# Patient Record
Sex: FEMALE | Race: ASIAN | Employment: UNEMPLOYED | URBAN - METROPOLITAN AREA
[De-identification: names, ages, dates, MRNs, and addresses within clinical notes are randomized per-mention and may not be internally consistent; named-entity substitution may affect disease eponyms.]

---

## 2017-06-12 ENCOUNTER — OFFICE VISIT (OUTPATIENT)
Dept: FAMILY MEDICINE CLINIC | Facility: CLINIC | Age: 22
End: 2017-06-12

## 2017-06-12 VITALS
HEIGHT: 64 IN | RESPIRATION RATE: 18 BRPM | OXYGEN SATURATION: 98 % | TEMPERATURE: 98 F | DIASTOLIC BLOOD PRESSURE: 60 MMHG | BODY MASS INDEX: 26.46 KG/M2 | WEIGHT: 155 LBS | HEART RATE: 96 BPM | SYSTOLIC BLOOD PRESSURE: 116 MMHG

## 2017-06-12 DIAGNOSIS — R51.9 HEADACHE, UNSPECIFIED HEADACHE TYPE: ICD-10-CM

## 2017-06-12 DIAGNOSIS — J30.2 SEASONAL ALLERGIC RHINITIS, UNSPECIFIED ALLERGIC RHINITIS TRIGGER: ICD-10-CM

## 2017-06-12 DIAGNOSIS — O23.42 UTI (URINARY TRACT INFECTION) DURING PREGNANCY, SECOND TRIMESTER: ICD-10-CM

## 2017-06-12 DIAGNOSIS — Z3A.17 17 WEEKS GESTATION OF PREGNANCY: Primary | ICD-10-CM

## 2017-06-12 PROCEDURE — 85025 COMPLETE CBC W/AUTO DIFF WBC: CPT | Performed by: FAMILY MEDICINE

## 2017-06-12 PROCEDURE — 87147 CULTURE TYPE IMMUNOLOGIC: CPT | Performed by: FAMILY MEDICINE

## 2017-06-12 PROCEDURE — 87086 URINE CULTURE/COLONY COUNT: CPT | Performed by: FAMILY MEDICINE

## 2017-06-12 PROCEDURE — 99204 OFFICE O/P NEW MOD 45 MIN: CPT | Performed by: FAMILY MEDICINE

## 2017-06-12 PROCEDURE — 81003 URINALYSIS AUTO W/O SCOPE: CPT | Performed by: FAMILY MEDICINE

## 2017-06-12 PROCEDURE — 36415 COLL VENOUS BLD VENIPUNCTURE: CPT | Performed by: FAMILY MEDICINE

## 2017-06-12 PROCEDURE — 80053 COMPREHEN METABOLIC PANEL: CPT | Performed by: FAMILY MEDICINE

## 2017-06-12 PROCEDURE — 81025 URINE PREGNANCY TEST: CPT | Performed by: FAMILY MEDICINE

## 2017-06-12 PROCEDURE — 84443 ASSAY THYROID STIM HORMONE: CPT | Performed by: FAMILY MEDICINE

## 2017-06-12 RX ORDER — PNV NO.95/FERROUS FUM/FOLIC AC 28MG-0.8MG
1 TABLET ORAL DAILY
Qty: 30 TABLET | Refills: 11 | Status: SHIPPED | OUTPATIENT
Start: 2017-06-12 | End: 2017-06-13

## 2017-06-12 RX ORDER — AMOXICILLIN AND CLAVULANATE POTASSIUM 875; 125 MG/1; MG/1
1 TABLET, FILM COATED ORAL 2 TIMES DAILY
Qty: 10 TABLET | Refills: 0 | Status: SHIPPED | OUTPATIENT
Start: 2017-06-12 | End: 2017-06-13

## 2017-06-12 NOTE — PROGRESS NOTES
HPI:    Patient ID: Brent Sexton is a 25year old female. HPI   21yr old gravid female presents to establish care, pregnancy, c/o urinary frequency, and headaches. This is her first pregnancy, LMP approximately 2/9/17.  Was seen by an OB in [place], and time. She appears well-developed and well-nourished. HENT:   Head: Normocephalic and atraumatic. Mouth/Throat: Oropharynx is clear and moist.   Swollen turbinates   Cardiovascular: Normal rate and regular rhythm.     No LE edema   Pulmonary/C Visit:  Signed Prescriptions Disp Refills    Amoxicillin-Pot Clavulanate 875-125 MG Oral Tab 10 tablet 0      Sig: Take 1 tablet by mouth 2 (two) times daily.       Prenatal Vit-Fe Fumarate-FA (PRENATAL VITAMIN) 27-0.8 MG Oral Tab 30 tablet 11      Sig: Nabeel Maharaj

## 2017-06-13 ENCOUNTER — OFFICE VISIT (OUTPATIENT)
Dept: OBGYN CLINIC | Facility: CLINIC | Age: 22
End: 2017-06-13

## 2017-06-13 ENCOUNTER — LAB ENCOUNTER (OUTPATIENT)
Dept: LAB | Age: 22
End: 2017-06-13
Attending: OBSTETRICS & GYNECOLOGY
Payer: MEDICAID

## 2017-06-13 VITALS
DIASTOLIC BLOOD PRESSURE: 72 MMHG | HEIGHT: 65 IN | SYSTOLIC BLOOD PRESSURE: 112 MMHG | WEIGHT: 157 LBS | BODY MASS INDEX: 26.16 KG/M2

## 2017-06-13 DIAGNOSIS — Z34.02 ENCOUNTER FOR SUPERVISION OF NORMAL FIRST PREGNANCY IN SECOND TRIMESTER: ICD-10-CM

## 2017-06-13 DIAGNOSIS — Z34.02 ENCOUNTER FOR SUPERVISION OF NORMAL FIRST PREGNANCY IN SECOND TRIMESTER: Primary | ICD-10-CM

## 2017-06-13 DIAGNOSIS — N89.8 VAGINAL DISCHARGE: ICD-10-CM

## 2017-06-13 DIAGNOSIS — Z11.3 SCREENING FOR STD (SEXUALLY TRANSMITTED DISEASE): ICD-10-CM

## 2017-06-13 PROCEDURE — 86901 BLOOD TYPING SEROLOGIC RH(D): CPT

## 2017-06-13 PROCEDURE — 87491 CHLMYD TRACH DNA AMP PROBE: CPT | Performed by: OBSTETRICS & GYNECOLOGY

## 2017-06-13 PROCEDURE — 0500F INITIAL PRENATAL CARE VISIT: CPT | Performed by: OBSTETRICS & GYNECOLOGY

## 2017-06-13 PROCEDURE — 87147 CULTURE TYPE IMMUNOLOGIC: CPT | Performed by: OBSTETRICS & GYNECOLOGY

## 2017-06-13 PROCEDURE — 87591 N.GONORRHOEAE DNA AMP PROB: CPT | Performed by: OBSTETRICS & GYNECOLOGY

## 2017-06-13 PROCEDURE — 36415 COLL VENOUS BLD VENIPUNCTURE: CPT

## 2017-06-13 PROCEDURE — 86762 RUBELLA ANTIBODY: CPT

## 2017-06-13 PROCEDURE — 87660 TRICHOMONAS VAGIN DIR PROBE: CPT | Performed by: OBSTETRICS & GYNECOLOGY

## 2017-06-13 PROCEDURE — 87480 CANDIDA DNA DIR PROBE: CPT | Performed by: OBSTETRICS & GYNECOLOGY

## 2017-06-13 PROCEDURE — 81511 FTL CGEN ABNOR FOUR ANAL: CPT

## 2017-06-13 PROCEDURE — 86900 BLOOD TYPING SEROLOGIC ABO: CPT

## 2017-06-13 PROCEDURE — 86850 RBC ANTIBODY SCREEN: CPT

## 2017-06-13 PROCEDURE — 87086 URINE CULTURE/COLONY COUNT: CPT | Performed by: OBSTETRICS & GYNECOLOGY

## 2017-06-13 PROCEDURE — 86780 TREPONEMA PALLIDUM: CPT

## 2017-06-13 PROCEDURE — 87340 HEPATITIS B SURFACE AG IA: CPT

## 2017-06-13 PROCEDURE — 87510 GARDNER VAG DNA DIR PROBE: CPT | Performed by: OBSTETRICS & GYNECOLOGY

## 2017-06-13 PROCEDURE — 87389 HIV-1 AG W/HIV-1&-2 AB AG IA: CPT

## 2017-06-13 NOTE — PROGRESS NOTES
Here for initial prenatal visit with our group. 25year old  at 17w5d by LMP c/w todays US. Unplanned pregnancy. Pt came from [facility] May 15th, 10 months.  there and became pregnant.      OB Hx: none   PMH: none   PSxH: none    Patient's last m

## 2017-06-14 NOTE — PROGRESS NOTES
Quick Note:    Left message on answering machine for patient to return phone call  Patient has 2 results  ______

## 2017-06-16 RX ORDER — METRONIDAZOLE 7.5 MG/G
1 GEL VAGINAL NIGHTLY
Qty: 70 G | Refills: 0 | Status: SHIPPED | OUTPATIENT
Start: 2017-06-16 | End: 2017-06-21

## 2017-06-16 RX ORDER — FLUCONAZOLE 150 MG/1
150 TABLET ORAL ONCE
Qty: 1 TABLET | Refills: 0 | Status: SHIPPED | OUTPATIENT
Start: 2017-06-16 | End: 2017-06-16

## 2017-06-16 NOTE — PROGRESS NOTES
Quick Note:    Patient informed of results. Verbalized understanding. Patient already started Augmentin from her PCP. Please advise if patient should start Keflex or continue w/ Augmentin. RX's sent to pharmacy. No further questions or concerns.   ____

## 2017-06-28 ENCOUNTER — TELEPHONE (OUTPATIENT)
Dept: OBGYN CLINIC | Facility: CLINIC | Age: 22
End: 2017-06-28

## 2017-06-28 ENCOUNTER — APPOINTMENT (OUTPATIENT)
Dept: OBGYN CLINIC | Facility: CLINIC | Age: 22
End: 2017-06-28

## 2017-06-28 ENCOUNTER — OFFICE VISIT (OUTPATIENT)
Dept: OBGYN CLINIC | Facility: CLINIC | Age: 22
End: 2017-06-28

## 2017-06-28 VITALS
WEIGHT: 161 LBS | DIASTOLIC BLOOD PRESSURE: 54 MMHG | SYSTOLIC BLOOD PRESSURE: 102 MMHG | HEIGHT: 65 IN | BODY MASS INDEX: 26.82 KG/M2

## 2017-06-28 DIAGNOSIS — Z34.02 ENCOUNTER FOR SUPERVISION OF NORMAL FIRST PREGNANCY IN SECOND TRIMESTER: ICD-10-CM

## 2017-06-28 DIAGNOSIS — Z36.89 ENCOUNTER FOR FETAL ANATOMIC SURVEY: Primary | ICD-10-CM

## 2017-06-28 DIAGNOSIS — G93.0 CHOROID PLEXUS CYST: ICD-10-CM

## 2017-06-28 PROCEDURE — 76805 OB US >/= 14 WKS SNGL FETUS: CPT | Performed by: OBSTETRICS & GYNECOLOGY

## 2017-06-28 PROCEDURE — 0502F SUBSEQUENT PRENATAL CARE: CPT | Performed by: OBSTETRICS & GYNECOLOGY

## 2017-06-28 NOTE — TELEPHONE ENCOUNTER
Per Dr. Garrison Hernandez, refer patient to [facility] for level II ultrasound for choroid plexus cyst and unable to visualize cardiac views  Order, prenatal and insurance card faxed to Dr. Rush [place] office. Patient given phone number to call Dr. Denia Amin.

## 2017-06-28 NOTE — PROGRESS NOTES
STU  Doing well  RH pos  S/P Anatomy scan  wnl except choroid plexus cyst and unable to visualize all cardiac views. MFM Level 2 recommended.  Normal quad screen   1 hr glucose (24-28wks)  TDAP recommended during pregnancy and instructions given  RTC q4wks

## 2017-06-28 NOTE — PATIENT INSTRUCTIONS
I am recommending a Level 2 ultrasound with maternal fetal medicine based on today's ultrasound findings. Our office will contact you once the referral is placed. SCREENING FOR GESTATIONAL DIABETES    Pregnancy is a stress to your body.   One way outpatient laboratory facility, you will be given a 50 gm carbohydrate drink. A blood glucose level will then be drawn one hour after ingestion. If the blood glucose value is normal and no other risk factors exist, no further testing is necessary.   If

## 2017-07-07 ENCOUNTER — OFFICE VISIT (OUTPATIENT)
Dept: FAMILY MEDICINE CLINIC | Facility: CLINIC | Age: 22
End: 2017-07-07

## 2017-07-07 VITALS
DIASTOLIC BLOOD PRESSURE: 52 MMHG | OXYGEN SATURATION: 99 % | RESPIRATION RATE: 16 BRPM | BODY MASS INDEX: 27.72 KG/M2 | WEIGHT: 166.38 LBS | HEIGHT: 65 IN | SYSTOLIC BLOOD PRESSURE: 90 MMHG | HEART RATE: 91 BPM | TEMPERATURE: 99 F

## 2017-07-07 DIAGNOSIS — H01.116 DERMATITIS CONTACT, EYELID, LEFT: Primary | ICD-10-CM

## 2017-07-07 PROCEDURE — 99213 OFFICE O/P EST LOW 20 MIN: CPT | Performed by: FAMILY MEDICINE

## 2017-07-07 NOTE — PROGRESS NOTES
HPI:    Patient ID: Regina Livingston is a 25year old female. HPI  24yr old female presents with c/o rash on her L upper eyelid that has been ongoing for the past 2mo on and off since she was staying in [facility]. Has tried otc lubricants without any relief.

## 2017-07-07 NOTE — PATIENT INSTRUCTIONS
Nonspecific Dermatitis  Dermatitis is a skin rash caused by something that touches the skin and makes it irritated and inflamed.  Your skin may be red, swollen, dry, and may be cracked. Blisters may form and ooze. The rash will itch.   Dermatitis can form · Use hydrocortisone cream for redness and irritation, unless another medicine was prescribed. You can also use benzocaine anesthetic cream or spray. · Use oral diphenhydramine to help reduce itching.  This is an antihistamine you can buy at [facility]

## 2017-07-12 NOTE — TELEPHONE ENCOUNTER
Dr. Juice Xiong office is closed today. Advised patient I can call tomorrow to see what additional information they need. Verbalized understanding. No further questions or concerns.

## 2017-07-12 NOTE — TELEPHONE ENCOUNTER
Pt called stating [facility] office says they need more info as to why she is being referred to them.

## 2017-07-13 ENCOUNTER — TELEPHONE (OUTPATIENT)
Dept: OBGYN CLINIC | Facility: CLINIC | Age: 22
End: 2017-07-13

## 2017-07-13 NOTE — TELEPHONE ENCOUNTER
Received Notification from [address] [place] [address] that Pt did not keep appt today at 230pm and was unable to reach via phone    Advisement letter put in Dr. Julianna dukes in [place]

## 2017-07-20 ENCOUNTER — TELEPHONE (OUTPATIENT)
Dept: OBGYN CLINIC | Facility: CLINIC | Age: 22
End: 2017-07-20

## 2017-07-27 ENCOUNTER — OFFICE VISIT (OUTPATIENT)
Dept: OBGYN CLINIC | Facility: CLINIC | Age: 22
End: 2017-07-27

## 2017-07-27 VITALS
HEIGHT: 65 IN | DIASTOLIC BLOOD PRESSURE: 56 MMHG | BODY MASS INDEX: 28.66 KG/M2 | SYSTOLIC BLOOD PRESSURE: 118 MMHG | WEIGHT: 172 LBS | HEART RATE: 112 BPM

## 2017-07-27 DIAGNOSIS — G93.0 CHOROID PLEXUS CYST: ICD-10-CM

## 2017-07-27 DIAGNOSIS — Z34.02 ENCOUNTER FOR SUPERVISION OF NORMAL FIRST PREGNANCY IN SECOND TRIMESTER: ICD-10-CM

## 2017-07-27 DIAGNOSIS — Z3A.24 24 WEEKS GESTATION OF PREGNANCY: Primary | ICD-10-CM

## 2017-07-27 PROCEDURE — 0502F SUBSEQUENT PRENATAL CARE: CPT | Performed by: OBSTETRICS & GYNECOLOGY

## 2017-07-27 NOTE — PROGRESS NOTES
Has not done 1 hr gct  Has u/s with mfm arranged for 8/9/17  Switching to [facility] tomorrow--\"more convenient\"  Recommend to get gct done

## 2017-07-27 NOTE — PATIENT INSTRUCTIONS
Tdap Vaccine: What You Need To Know    1. Why Get Vaccinated:    · Tetanus, diphtheria, and pertussis can be very serious diseases, even for adolescents and adults. Tdap vaccine can protect us from these diseases.     · Tetanus (Lockjaw) causes painful mu tetanus infection. SCREENING FOR GESTATIONAL DIABETES    Pregnancy is a stress to your body. One way in which the stress may manifest itself is through an abnormality in sugar metabolism.   In non-pregnant patients, this abnormality (a lack of insulin) the blood glucose value is normal and no other risk factors exist, no further testing is necessary.   If the blood glucose is abnormal, this indicates an increased risk of gestational diabetes and will require a formal three hour glucose tolerance test.  Th

## 2017-07-28 PROCEDURE — 87086 URINE CULTURE/COLONY COUNT: CPT | Performed by: OBSTETRICS & GYNECOLOGY

## 2017-08-16 ENCOUNTER — OFFICE VISIT (OUTPATIENT)
Dept: PERINATAL CARE | Facility: HOSPITAL | Age: 22
End: 2017-08-16
Attending: OBSTETRICS & GYNECOLOGY
Payer: MEDICAID

## 2017-08-16 VITALS
BODY MASS INDEX: 29 KG/M2 | SYSTOLIC BLOOD PRESSURE: 114 MMHG | DIASTOLIC BLOOD PRESSURE: 57 MMHG | WEIGHT: 174 LBS | HEART RATE: 107 BPM

## 2017-08-16 DIAGNOSIS — O35.0XX0 CHOROID PLEXUS CYST OF FETUS AFFECTING CARE OF MOTHER, ANTEPARTUM, NOT APPLICABLE OR UNSPECIFIED FETUS: ICD-10-CM

## 2017-08-16 DIAGNOSIS — Z03.73 SUSPECTED FETAL ANOMALY NOT FOUND: Primary | ICD-10-CM

## 2017-08-16 PROCEDURE — 76811 OB US DETAILED SNGL FETUS: CPT

## 2017-08-16 PROCEDURE — 99242 OFF/OP CONSLTJ NEW/EST SF 20: CPT

## 2017-08-16 NOTE — PROGRESS NOTES
Outpatient Maternal-Fetal Medicine Consultation    Dear Dr. Eliceo Wagoner,    Thank you for requesting ultrasound evaluation and maternal fetal medicine consultation on your patient Dominique Zamora.   As you are aware she is a 25year old female with a [place] pre non-tender  Extremities: non-tender, no edema      OBSTETRIC ULTRASOUND  A level II ultrasound was performed prior to the consultation which I interpreted and discussed with the patient and her mother. The fetal measurements are consistent with dates.   Jayleen Mcleod falx, cerebellum, cerebellar lobes, posterior fossa, vermis, cavum septi pellucidi. Heart: Visualized and normal appearance: cardiac location, four-chamber view, left outflow tract, right outflow tract, ventricles, great vessels, Ductal arch.    Aortic arc patient. Please do not hesitate to contact me if additional questions or concerns arise. Adria Vasquez M.D. The majority of the time (>50%) was spent in review of records, consultation and coordination of care. Our discussion is summarized above.  Terry Li

## 2017-09-26 PROCEDURE — 82950 GLUCOSE TEST: CPT | Performed by: OBSTETRICS & GYNECOLOGY

## 2017-09-26 PROCEDURE — 87389 HIV-1 AG W/HIV-1&-2 AB AG IA: CPT | Performed by: OBSTETRICS & GYNECOLOGY

## 2017-10-06 ENCOUNTER — OFFICE VISIT (OUTPATIENT)
Dept: HEMATOLOGY/ONCOLOGY | Facility: HOSPITAL | Age: 22
End: 2017-10-06
Attending: SPECIALIST
Payer: MEDICAID

## 2017-10-06 VITALS
OXYGEN SATURATION: 98 % | HEART RATE: 114 BPM | DIASTOLIC BLOOD PRESSURE: 72 MMHG | HEIGHT: 65 IN | WEIGHT: 193.81 LBS | BODY MASS INDEX: 32.29 KG/M2 | RESPIRATION RATE: 18 BRPM | SYSTOLIC BLOOD PRESSURE: 109 MMHG

## 2017-10-06 DIAGNOSIS — D50.0 IRON DEFICIENCY ANEMIA DUE TO CHRONIC BLOOD LOSS: Primary | ICD-10-CM

## 2017-10-06 DIAGNOSIS — O99.013 ANEMIA AFFECTING PREGNANCY IN THIRD TRIMESTER: ICD-10-CM

## 2017-10-06 DIAGNOSIS — D50.9 HYPOCHROMIC MICROCYTIC ANEMIA: ICD-10-CM

## 2017-10-06 PROCEDURE — 99245 OFF/OP CONSLTJ NEW/EST HI 55: CPT | Performed by: SPECIALIST

## 2017-10-06 NOTE — PROGRESS NOTES
Patient is here today for Hematology consult with Marquis Valdez. Patient denies pain. Patient stated is pregnant Due date Nov 16,2017. Patient did not take oral iron. Is on prenatal vitamins. Medication list, medical history were reviewed and updated.     Educ

## 2017-10-09 PROBLEM — D50.9 HYPOCHROMIC MICROCYTIC ANEMIA: Status: ACTIVE | Noted: 2017-10-09

## 2017-10-09 PROBLEM — O99.013 ANEMIA AFFECTING PREGNANCY IN THIRD TRIMESTER (HCC): Status: ACTIVE | Noted: 2017-10-09

## 2017-10-09 PROBLEM — D50.0 IRON DEFICIENCY ANEMIA DUE TO CHRONIC BLOOD LOSS: Status: ACTIVE | Noted: 2017-10-09

## 2017-10-09 PROBLEM — O99.013 ANEMIA AFFECTING PREGNANCY IN THIRD TRIMESTER: Status: ACTIVE | Noted: 2017-10-09

## 2017-10-09 NOTE — PROGRESS NOTES
[facility] Report of Consultation      Patient Name: Rodrigue Hernandez   YOB: 1995  Medical Record Number: [id number]  Consulting Physician: Saurabh Constantino M.D. Referring Physician: Heladio Lechuga M.D.     Date of Consultation: 10/6/2 palpitations, edema, orthopnea. Gastrointestinal   Intermittent constipation. Genitourinary      No vaginal bleeding. Musculoskeletal   No edema; no joint pain, swelling, redness, change in range of motion.   Integumentary      No acute or chronic rashe - 45 %   -VITAMIN B12   Collection Time: 10/06/17  3:52 PM   Result Value Ref Range   Vitamin B12 299 193 - 032 pg/mL   -FOLIC ACID SERUM(FOLATE)   Collection Time: 10/06/17  3:52 PM   Result Value Ref Range   Folate (Folic Acid), Serum 60.5 8.7 - 24.0 ng/ Hematology Oncology Group  Director, Bone and Soft Tissue Sarcoma Program  Section of Hematology/Oncology, [address] [facility]

## 2017-10-10 ENCOUNTER — OFFICE VISIT (OUTPATIENT)
Dept: HEMATOLOGY/ONCOLOGY | Facility: HOSPITAL | Age: 22
End: 2017-10-10
Attending: SPECIALIST
Payer: MEDICAID

## 2017-10-10 VITALS
OXYGEN SATURATION: 99 % | TEMPERATURE: 98 F | RESPIRATION RATE: 18 BRPM | HEART RATE: 97 BPM | DIASTOLIC BLOOD PRESSURE: 67 MMHG | SYSTOLIC BLOOD PRESSURE: 102 MMHG

## 2017-10-10 DIAGNOSIS — D50.0 IRON DEFICIENCY ANEMIA DUE TO CHRONIC BLOOD LOSS: Primary | ICD-10-CM

## 2017-10-10 DIAGNOSIS — O99.013 ANEMIA AFFECTING PREGNANCY IN THIRD TRIMESTER: ICD-10-CM

## 2017-10-10 DIAGNOSIS — D50.9 HYPOCHROMIC MICROCYTIC ANEMIA: ICD-10-CM

## 2017-10-10 PROCEDURE — 96365 THER/PROPH/DIAG IV INF INIT: CPT

## 2017-10-10 NOTE — PROGRESS NOTES
Education Record    Learner:  Patient    Disease / Freddy Meenakshi deficiency anemia due to chronic blood loss    Barriers / Limitations:  None   Comments:    Method:  Brief focused   Comments:    General Topics:  Infection, Medication, Pain, Precautions, P

## 2017-10-17 ENCOUNTER — APPOINTMENT (OUTPATIENT)
Dept: HEMATOLOGY/ONCOLOGY | Facility: HOSPITAL | Age: 22
End: 2017-10-17
Attending: INTERNAL MEDICINE
Payer: MEDICAID

## 2017-10-23 PROCEDURE — 87081 CULTURE SCREEN ONLY: CPT | Performed by: OBSTETRICS & GYNECOLOGY

## 2017-11-16 ENCOUNTER — HOSPITAL ENCOUNTER (OUTPATIENT)
Dept: ULTRASOUND IMAGING | Age: 22
Discharge: HOME | End: 2017-11-16
Attending: OBSTETRICS & GYNECOLOGY
Payer: MEDICAID

## 2017-11-16 DIAGNOSIS — Z34.03 ENCOUNTER FOR SUPERVISION OF NORMAL FIRST PREGNANCY IN THIRD TRIMESTER: ICD-10-CM

## 2017-11-16 PROCEDURE — 76805 OB US >/= 14 WKS SNGL FETUS: CPT | Performed by: OBSTETRICS & GYNECOLOGY

## 2017-11-17 ENCOUNTER — TELEPHONE (OUTPATIENT)
Dept: OBGYN UNIT | Facility: HOSPITAL | Age: 22
End: 2017-11-17

## 2017-11-21 ENCOUNTER — APPOINTMENT (OUTPATIENT)
Dept: OBGYN CLINIC | Facility: HOSPITAL | Age: 22
End: 2017-11-21
Payer: MEDICAID

## 2017-11-21 ENCOUNTER — HOSPITAL ENCOUNTER (INPATIENT)
Facility: HOSPITAL | Age: 22
LOS: 3 days | Discharge: HOME | End: 2017-11-24
Attending: OBSTETRICS & GYNECOLOGY | Admitting: OBSTETRICS & GYNECOLOGY
Payer: MEDICAID

## 2017-11-21 PROBLEM — Z34.90 PREGNANCY: Status: ACTIVE | Noted: 2017-11-21

## 2017-11-21 PROBLEM — Z34.90 PREGNANCY (HCC): Status: ACTIVE | Noted: 2017-11-21

## 2017-11-21 PROCEDURE — 81002 URINALYSIS NONAUTO W/O SCOPE: CPT

## 2017-11-21 PROCEDURE — 86850 RBC ANTIBODY SCREEN: CPT | Performed by: OBSTETRICS & GYNECOLOGY

## 2017-11-21 PROCEDURE — 86780 TREPONEMA PALLIDUM: CPT | Performed by: OBSTETRICS & GYNECOLOGY

## 2017-11-21 PROCEDURE — 86901 BLOOD TYPING SEROLOGIC RH(D): CPT | Performed by: OBSTETRICS & GYNECOLOGY

## 2017-11-21 PROCEDURE — 85027 COMPLETE CBC AUTOMATED: CPT | Performed by: OBSTETRICS & GYNECOLOGY

## 2017-11-21 PROCEDURE — 86900 BLOOD TYPING SEROLOGIC ABO: CPT | Performed by: OBSTETRICS & GYNECOLOGY

## 2017-11-21 RX ORDER — SODIUM CHLORIDE, SODIUM LACTATE, POTASSIUM CHLORIDE, CALCIUM CHLORIDE 600; 310; 30; 20 MG/100ML; MG/100ML; MG/100ML; MG/100ML
INJECTION, SOLUTION INTRAVENOUS CONTINUOUS
Status: DISCONTINUED | OUTPATIENT
Start: 2017-11-21 | End: 2017-11-22

## 2017-11-21 RX ORDER — TRISODIUM CITRATE DIHYDRATE AND CITRIC ACID MONOHYDRATE 500; 334 MG/5ML; MG/5ML
30 SOLUTION ORAL AS NEEDED
Status: DISCONTINUED | OUTPATIENT
Start: 2017-11-21 | End: 2017-11-22

## 2017-11-21 RX ORDER — IBUPROFEN 600 MG/1
600 TABLET ORAL ONCE AS NEEDED
Status: DISCONTINUED | OUTPATIENT
Start: 2017-11-21 | End: 2017-11-22

## 2017-11-21 RX ORDER — ZOLPIDEM TARTRATE 5 MG/1
5 TABLET ORAL NIGHTLY PRN
Status: DISCONTINUED | OUTPATIENT
Start: 2017-11-21 | End: 2017-11-22

## 2017-11-21 RX ORDER — HYDROMORPHONE HYDROCHLORIDE 1 MG/ML
INJECTION, SOLUTION INTRAMUSCULAR; INTRAVENOUS; SUBCUTANEOUS
Status: COMPLETED
Start: 2017-11-21 | End: 2017-11-22

## 2017-11-21 RX ORDER — AMPICILLIN 1 G/1
1 INJECTION, POWDER, FOR SOLUTION INTRAMUSCULAR; INTRAVENOUS EVERY 4 HOURS
Status: DISCONTINUED | OUTPATIENT
Start: 2017-11-22 | End: 2017-11-22

## 2017-11-21 RX ORDER — TERBUTALINE SULFATE 1 MG/ML
0.25 INJECTION, SOLUTION SUBCUTANEOUS AS NEEDED
Status: DISCONTINUED | OUTPATIENT
Start: 2017-11-21 | End: 2017-11-22

## 2017-11-21 RX ORDER — DEXTROSE, SODIUM CHLORIDE, SODIUM LACTATE, POTASSIUM CHLORIDE, AND CALCIUM CHLORIDE 5; .6; .31; .03; .02 G/100ML; G/100ML; G/100ML; G/100ML; G/100ML
INJECTION, SOLUTION INTRAVENOUS AS NEEDED
Status: DISCONTINUED | OUTPATIENT
Start: 2017-11-21 | End: 2017-11-22

## 2017-11-21 RX ORDER — HYDROMORPHONE HYDROCHLORIDE 1 MG/ML
1 INJECTION, SOLUTION INTRAMUSCULAR; INTRAVENOUS; SUBCUTANEOUS ONCE
Status: COMPLETED | OUTPATIENT
Start: 2017-11-22 | End: 2017-11-22

## 2017-11-22 PROCEDURE — A4216 STERILE WATER/SALINE, 10 ML: HCPCS | Performed by: OBSTETRICS & GYNECOLOGY

## 2017-11-22 PROCEDURE — 3E0P7VZ INTRODUCTION OF HORMONE INTO FEMALE REPRODUCTIVE, VIA NATURAL OR ARTIFICIAL OPENING: ICD-10-PCS | Performed by: OBSTETRICS & GYNECOLOGY

## 2017-11-22 PROCEDURE — 0KQM0ZZ REPAIR PERINEUM MUSCLE, OPEN APPROACH: ICD-10-PCS | Performed by: OBSTETRICS & GYNECOLOGY

## 2017-11-22 RX ORDER — ACETAMINOPHEN 325 MG/1
650 TABLET ORAL EVERY 4 HOURS PRN
Status: DISCONTINUED | OUTPATIENT
Start: 2017-11-22 | End: 2017-11-24

## 2017-11-22 RX ORDER — METHYLERGONOVINE MALEATE 0.2 MG/ML
0.2 INJECTION INTRAVENOUS ONCE
Status: DISCONTINUED | OUTPATIENT
Start: 2017-11-22 | End: 2017-11-22

## 2017-11-22 RX ORDER — MISOPROSTOL 200 UG/1
1000 TABLET ORAL EVERY 6 HOURS SCHEDULED
Status: DISCONTINUED | OUTPATIENT
Start: 2017-11-22 | End: 2017-11-22

## 2017-11-22 RX ORDER — IBUPROFEN 600 MG/1
600 TABLET ORAL EVERY 6 HOURS
Status: DISCONTINUED | OUTPATIENT
Start: 2017-11-23 | End: 2017-11-24

## 2017-11-22 RX ORDER — DOCUSATE SODIUM 100 MG/1
100 CAPSULE, LIQUID FILLED ORAL
Status: DISCONTINUED | OUTPATIENT
Start: 2017-11-22 | End: 2017-11-24

## 2017-11-22 RX ORDER — BISACODYL 10 MG
10 SUPPOSITORY, RECTAL RECTAL ONCE AS NEEDED
Status: ACTIVE | OUTPATIENT
Start: 2017-11-22 | End: 2017-11-22

## 2017-11-22 RX ORDER — AMPICILLIN 1 G/1
1 INJECTION, POWDER, FOR SOLUTION INTRAMUSCULAR; INTRAVENOUS EVERY 4 HOURS
Status: DISCONTINUED | OUTPATIENT
Start: 2017-11-22 | End: 2017-11-22

## 2017-11-22 RX ORDER — EPHEDRINE SULFATE 50 MG/ML
5 INJECTION, SOLUTION INTRAVENOUS AS NEEDED
Status: DISCONTINUED | OUTPATIENT
Start: 2017-11-22 | End: 2017-11-22

## 2017-11-22 RX ORDER — METHYLERGONOVINE MALEATE 0.2 MG/ML
INJECTION INTRAVENOUS
Status: COMPLETED
Start: 2017-11-22 | End: 2017-11-22

## 2017-11-22 RX ORDER — ZOLPIDEM TARTRATE 5 MG/1
5 TABLET ORAL NIGHTLY PRN
Status: DISCONTINUED | OUTPATIENT
Start: 2017-11-22 | End: 2017-11-24

## 2017-11-22 RX ORDER — HYDROCODONE BITARTRATE AND ACETAMINOPHEN 5; 325 MG/1; MG/1
1 TABLET ORAL EVERY 4 HOURS PRN
Status: DISCONTINUED | OUTPATIENT
Start: 2017-11-22 | End: 2017-11-24

## 2017-11-22 RX ORDER — HYDROCODONE BITARTRATE AND ACETAMINOPHEN 5; 325 MG/1; MG/1
2 TABLET ORAL EVERY 4 HOURS PRN
Status: DISCONTINUED | OUTPATIENT
Start: 2017-11-22 | End: 2017-11-24

## 2017-11-22 RX ORDER — SIMETHICONE 80 MG
80 TABLET,CHEWABLE ORAL 3 TIMES DAILY PRN
Status: DISCONTINUED | OUTPATIENT
Start: 2017-11-22 | End: 2017-11-24

## 2017-11-22 RX ORDER — NALBUPHINE HCL 10 MG/ML
2.5 AMPUL (ML) INJECTION
Status: DISCONTINUED | OUTPATIENT
Start: 2017-11-22 | End: 2017-11-24

## 2017-11-22 RX ORDER — MISOPROSTOL 200 UG/1
TABLET ORAL
Status: DISPENSED
Start: 2017-11-22 | End: 2017-11-23

## 2017-11-22 NOTE — PLAN OF CARE
Problem: SAFETY ADULT - FALL  Goal: Free from fall injury  INTERVENTIONS:  - Assess pt frequently for physical needs  - Identify cognitive and physical deficits and behaviors that affect risk of falls.   - [place] fall precautions as indicated by assessme

## 2017-11-22 NOTE — PLAN OF CARE
Problem: Patient/Family Goals  Goal: Patient/Family Long Term Goal  Patient's Long Term Goal: uncomplicated     Interventions:  -   - See additional Care Plan goals for specific interventions   Outcome: Progressing    Goal: Patient/Family Short Term Go

## 2017-11-22 NOTE — H&P
Idalia Liang 172 Patient Status:  Inpatient    1995 MRN [id number]   Location [address] [facility] Attending [employer], MD   [facility] Day # 1 PCP Meredith Suh DO     SUBJECTIVE:    Ekaterina Vazquez is a contractions: regular, every 2 minutes      Cervix: dilation 5-6, effacement 80 and station -3; AROM with clear fluid     Lab Review:  B, Rh+, Rubella-immune, Hepatitis B surface antigen non-reactive, GBS positive    Lab Results  Component Value Date   WBC

## 2017-11-22 NOTE — L&D DELIVERY NOTE
Vaginal Delivery Note          Juan Shaw Patient Status:  Inpatient    1995 MRN [id number]   Location [unfilled] Attending Mirta Santana MD   Hosp Day # 1 PCP Yazmin Grayson DO       Pre Op katie. There was some uterine atony which responded to pitocin, IM methergine and 1000ug cytotec. A recto-vaginal exam was normal and bleeding was minimal.  The patient was then moved to the supine position in stable condition. Counts were correct.

## 2017-11-22 NOTE — PROGRESS NOTES
admitted at 36 5/7 for cervidil induction. Denies srom or vaginal bleeding. + fetal movement. POC reviewed, questions answered. Call light with in reach.  Pt was admitted at [facility]

## 2017-11-23 PROCEDURE — 85025 COMPLETE CBC W/AUTO DIFF WBC: CPT | Performed by: OBSTETRICS & GYNECOLOGY

## 2017-11-23 NOTE — PROGRESS NOTES
NURSING ADMISSION NOTE      Patient admitted via Wheelchair  Oriented to room 2197. Safety precautions initiated. Bed in low position. Call light in reach. Plan of care discussed and questions answered at this time.

## 2017-11-23 NOTE — PROGRESS NOTES
OB Progress Note PPD#1    S: Feels well. Ambulating, eating. Pain controlled. Moderate VB. Breastfeeding. Voiding without difficulty, + flatus, no BM  O:   Blood pressure 120/73, pulse 96, temperature 97.8 °F (36.6 °C), resp.  rate 18, height 5' 1.81\" (1.

## 2017-11-23 NOTE — PROGRESS NOTES
Pt transferred  to Mother Baby room 8414 in stable condition. Report given to [facility] . Infant transferred with mother in stable condition.

## 2017-11-23 NOTE — PROGRESS NOTES
Mom encouraged all day to get up and ambulate in room and hallway,states did get up and walk about room this afternoon.

## 2017-11-24 VITALS
DIASTOLIC BLOOD PRESSURE: 63 MMHG | HEIGHT: 61.81 IN | HEART RATE: 79 BPM | OXYGEN SATURATION: 99 % | WEIGHT: 203.63 LBS | BODY MASS INDEX: 37.47 KG/M2 | RESPIRATION RATE: 18 BRPM | TEMPERATURE: 98 F | SYSTOLIC BLOOD PRESSURE: 117 MMHG

## 2017-11-24 PROBLEM — Z34.90 PREGNANCY: Status: RESOLVED | Noted: 2017-11-21 | Resolved: 2017-11-24

## 2017-11-24 PROBLEM — Z34.90 PREGNANCY (HCC): Status: RESOLVED | Noted: 2017-11-21 | Resolved: 2017-11-24

## 2017-11-24 RX ORDER — BREAST PUMP
1 EACH MISCELLANEOUS
Qty: 1 EACH | Refills: 0 | Status: SHIPPED | OUTPATIENT
Start: 2017-11-24 | End: 2018-09-24 | Stop reason: ALTCHOICE

## 2017-11-24 RX ORDER — IBUPROFEN 600 MG/1
600 TABLET ORAL EVERY 6 HOURS PRN
Qty: 60 TABLET | Refills: 0 | Status: SHIPPED | OUTPATIENT
Start: 2017-11-24 | End: 2018-02-22

## 2017-11-24 NOTE — PROGRESS NOTES
OB Progress Note PPD#2  S: Feels well. Ambulating, eating. Pain controlled. Minimal VB. Breastfeeding. O:   Blood pressure 117/63, pulse 79, temperature 98 °F (36.7 °C), temperature source Oral, resp.  rate 18, height 5' 1.81\" (1.57 m), weight 203 lb 9

## 2017-11-27 ENCOUNTER — TELEPHONE (OUTPATIENT)
Dept: OBGYN UNIT | Facility: HOSPITAL | Age: 22
End: 2017-11-27

## 2017-11-27 NOTE — PROGRESS NOTES
Reviewed self and infant care w / mom, she verbalizes understanding of instructions reviewed. Encourage to follow up w/ MDs as directed and w/ questions/concerns. Lives w her mom for support and help,  in [place]. Enc to go to ct.

## 2018-02-01 ENCOUNTER — PATIENT MESSAGE (OUTPATIENT)
Dept: FAMILY MEDICINE CLINIC | Facility: CLINIC | Age: 23
End: 2018-02-01

## 2018-05-04 ENCOUNTER — PATIENT MESSAGE (OUTPATIENT)
Dept: FAMILY MEDICINE CLINIC | Facility: CLINIC | Age: 23
End: 2018-05-04

## 2018-05-04 NOTE — TELEPHONE ENCOUNTER
From: Messi Yoder DO  Sent: 5/4/2018 3:14 PM CDT  Subject: Non-Urgent Medical Question    Hi my wrist have been hurting very bad lately this has been going on since I was pregnant it's just not going away I tried the wrist sup

## 2018-09-24 ENCOUNTER — OFFICE VISIT (OUTPATIENT)
Dept: FAMILY MEDICINE CLINIC | Facility: CLINIC | Age: 23
End: 2018-09-24
Payer: MEDICAID

## 2018-09-24 VITALS
TEMPERATURE: 99 F | DIASTOLIC BLOOD PRESSURE: 60 MMHG | WEIGHT: 199.38 LBS | HEART RATE: 86 BPM | HEIGHT: 65 IN | RESPIRATION RATE: 16 BRPM | OXYGEN SATURATION: 99 % | BODY MASS INDEX: 33.22 KG/M2 | SYSTOLIC BLOOD PRESSURE: 110 MMHG

## 2018-09-24 DIAGNOSIS — H10.11 ALLERGIC CONJUNCTIVITIS OF RIGHT EYE: Primary | ICD-10-CM

## 2018-09-24 PROCEDURE — 99213 OFFICE O/P EST LOW 20 MIN: CPT | Performed by: FAMILY MEDICINE

## 2018-09-24 NOTE — PROGRESS NOTES
HPI:    Patient ID: Juan Shaw is a 26year old female. HPI   23yr old female presents with c/o R eye irritation for the past 2d. States that she received new contacts and was trying to put it in the other day and had a stinging sensation.  Initially, needed    No orders of the defined types were placed in this encounter.       Meds This Visit:  Requested Prescriptions      No prescriptions requested or ordered in this encounter       Imaging & Referrals:  None       MANZO#[id number]

## 2018-09-24 NOTE — PATIENT INSTRUCTIONS
Conjunctivitis Caused by Irritation     Your healthcare provider may prescribe eye drops to help relieve symptoms. Conjunctivitis may be caused by allergies to animals, chemicals, or other irritants. This includes eye drops.  Most eye drops contain ch

## 2018-10-15 ENCOUNTER — PATIENT MESSAGE (OUTPATIENT)
Dept: FAMILY MEDICINE CLINIC | Facility: CLINIC | Age: 23
End: 2018-10-15

## 2018-10-15 NOTE — TELEPHONE ENCOUNTER
Pt sent additional messaged, added to original    To: [facility] CLINICAL STAFF      From: Bradley Rock      Created: 10/15/2018 2:06 PM        *-*-*This message has not been handled. *-*-*    Could it be  because I'm about to get my cycle ?

## 2018-10-15 NOTE — TELEPHONE ENCOUNTER
From: Ekaterina Calvo DO  Sent: 10/15/2018 2:06 PM CDT  Subject: Non-Urgent Medical Question    Could it be because I'm about to get my cycle ?

## 2019-10-22 ENCOUNTER — OFFICE VISIT (OUTPATIENT)
Dept: FAMILY MEDICINE CLINIC | Facility: CLINIC | Age: 24
End: 2019-10-22
Payer: MEDICAID

## 2019-10-22 VITALS
HEIGHT: 65 IN | TEMPERATURE: 99 F | WEIGHT: 187 LBS | OXYGEN SATURATION: 98 % | BODY MASS INDEX: 31.16 KG/M2 | SYSTOLIC BLOOD PRESSURE: 106 MMHG | RESPIRATION RATE: 16 BRPM | DIASTOLIC BLOOD PRESSURE: 66 MMHG | HEART RATE: 100 BPM

## 2019-10-22 DIAGNOSIS — H01.136 ECZEMA OF EYELID, LEFT: ICD-10-CM

## 2019-10-22 DIAGNOSIS — H10.32 ACUTE BACTERIAL CONJUNCTIVITIS OF LEFT EYE: Primary | ICD-10-CM

## 2019-10-22 DIAGNOSIS — Z23 NEED FOR VACCINATION: ICD-10-CM

## 2019-10-22 PROCEDURE — 90686 IIV4 VACC NO PRSV 0.5 ML IM: CPT | Performed by: FAMILY MEDICINE

## 2019-10-22 PROCEDURE — 90471 IMMUNIZATION ADMIN: CPT | Performed by: FAMILY MEDICINE

## 2019-10-22 PROCEDURE — 99214 OFFICE O/P EST MOD 30 MIN: CPT | Performed by: FAMILY MEDICINE

## 2019-10-22 RX ORDER — TOBRAMYCIN 3 MG/ML
2 SOLUTION/ DROPS OPHTHALMIC EVERY 6 HOURS
Qty: 1 BOTTLE | Refills: 0 | Status: SHIPPED | OUTPATIENT
Start: 2019-10-22 | End: 2019-10-27

## 2019-10-22 NOTE — PROGRESS NOTES
HPI:    Patient ID: Lamar Monahan is a 27year old female. HPI   24yr old female presents with c/o L eye redness and discharge. States she had burning sensation in her L eye yesterday at work and it felt itchy.  After she got home, she removed her contac left eye  - advised to keep area clean/dry, warm compresses and good hand hygiene  - avoid use of contacts or makeup until symptoms resolve, use new pair of contacts once symptoms resolve  - monitor  - Tobramycin Sulfate 0.3 % Ophthalmic Solution; Place 2

## 2019-11-08 ENCOUNTER — TELEPHONE (OUTPATIENT)
Dept: FAMILY MEDICINE CLINIC | Facility: CLINIC | Age: 24
End: 2019-11-08

## 2019-11-08 NOTE — TELEPHONE ENCOUNTER
Called patient and she said she will call back to r/s she completely forgot about the appointment and she needs a late appointment .

## 2020-10-29 ENCOUNTER — HOSPITAL ENCOUNTER (EMERGENCY)
Facility: HOSPITAL | Age: 25
Discharge: HOME | End: 2020-10-29
Attending: EMERGENCY MEDICINE
Payer: MEDICAID

## 2020-10-29 VITALS
OXYGEN SATURATION: 97 % | WEIGHT: 187.38 LBS | HEIGHT: 64.96 IN | RESPIRATION RATE: 19 BRPM | BODY MASS INDEX: 31.22 KG/M2 | SYSTOLIC BLOOD PRESSURE: 124 MMHG | TEMPERATURE: 98 F | DIASTOLIC BLOOD PRESSURE: 71 MMHG | HEART RATE: 109 BPM

## 2020-10-29 DIAGNOSIS — Z20.822 PERSON UNDER INVESTIGATION FOR COVID-19: Primary | ICD-10-CM

## 2020-10-29 DIAGNOSIS — B34.9 VIRAL ILLNESS: ICD-10-CM

## 2020-10-29 PROCEDURE — 99283 EMERGENCY DEPT VISIT LOW MDM: CPT

## 2020-10-29 NOTE — ED INITIAL ASSESSMENT (HPI)
Pt to ED with complaints of generalized body aches and feeling feverish. Pt reports getting covid tested one week ago but has not heard results. No positive contacts per pt.

## 2020-10-30 NOTE — ED PROVIDER NOTES
Patient Seen in: [facility] Emergency Department      History   Patient presents with: Body ache and/or chills    Stated Complaint: fever     HPI    Patient is a pleasant 26-year-old female who presents with several complaints.   Generalized body ac throughout. No rebound or guarding  Extremities: No clubbing/cyanosis/edema. Skin: No rashes, no pallor  Neuro: Awake oriented ×3. Nonfocal.  Good strength throughout    ED Course     Labs Reviewed   SARS-COV-2 BY PCR ()          Viral syndrome.   I

## 2020-12-01 ENCOUNTER — HOSPITAL ENCOUNTER (EMERGENCY)
Facility: HOSPITAL | Age: 25
Discharge: HOME | End: 2020-12-01
Attending: PEDIATRICS
Payer: MEDICAID

## 2020-12-01 VITALS
HEART RATE: 79 BPM | DIASTOLIC BLOOD PRESSURE: 65 MMHG | TEMPERATURE: 98 F | BODY MASS INDEX: 33 KG/M2 | RESPIRATION RATE: 18 BRPM | SYSTOLIC BLOOD PRESSURE: 114 MMHG | WEIGHT: 199.31 LBS | OXYGEN SATURATION: 99 %

## 2020-12-01 DIAGNOSIS — N30.00 ACUTE CYSTITIS WITHOUT HEMATURIA: ICD-10-CM

## 2020-12-01 DIAGNOSIS — J02.9 VIRAL PHARYNGITIS: Primary | ICD-10-CM

## 2020-12-01 PROCEDURE — 87086 URINE CULTURE/COLONY COUNT: CPT | Performed by: PEDIATRICS

## 2020-12-01 PROCEDURE — 87081 CULTURE SCREEN ONLY: CPT | Performed by: PEDIATRICS

## 2020-12-01 PROCEDURE — 87430 STREP A AG IA: CPT | Performed by: PEDIATRICS

## 2020-12-01 PROCEDURE — 81025 URINE PREGNANCY TEST: CPT

## 2020-12-01 PROCEDURE — 99283 EMERGENCY DEPT VISIT LOW MDM: CPT

## 2020-12-01 PROCEDURE — 81001 URINALYSIS AUTO W/SCOPE: CPT | Performed by: PEDIATRICS

## 2020-12-01 RX ORDER — SULFAMETHOXAZOLE AND TRIMETHOPRIM 800; 160 MG/1; MG/1
1 TABLET ORAL 2 TIMES DAILY
Qty: 14 TABLET | Refills: 0 | Status: SHIPPED | OUTPATIENT
Start: 2020-12-01 | End: 2020-12-08

## 2020-12-01 RX ORDER — ACETAMINOPHEN 500 MG
1000 TABLET ORAL ONCE
Status: COMPLETED | OUTPATIENT
Start: 2020-12-01 | End: 2020-12-01

## 2020-12-01 NOTE — ED PROVIDER NOTES
Patient Seen in: [facility] Emergency Department      History   Patient presents with:  Sore Throat    Stated Complaint: sore throat, congestion no contact with anyone covid positive.      HPI    70-year-old female with 2-day history of sore throat a not erythematous. Left Ear: Tympanic membrane, ear canal and external ear normal.  No middle ear effusion. Tympanic membrane is not erythematous. Nose: Nose normal. No congestion or rhinorrhea.       Mouth/Throat:      Mouth: Mucous membranes are Coordination: Coordination normal.   Psychiatric:         Mood and Affect: Mood normal.         Behavior: Behavior normal.         Thought Content:  Thought content normal.         Judgment: Judgment normal.           ED Course     Labs Reviewed   Jossy Rizo course of events that occurred in the emergency department. Instructed to return to emergency department or contact PCP for persistent, recurrent, or worsening symptoms.                   Disposition and Plan     Clinical Impression:  Viral pharyngitis  (pr

## 2020-12-01 NOTE — ED INITIAL ASSESSMENT (HPI)
Pt here for evaluation of sore throat and headache. Work requesting COVID testing  Pt reports, \"I started last night with headache and sore throat. Today it seems worse. \"  No fevers. No vomiting or diarrhea. Mild suprapubic tenderness.  Tolerating food an

## 2021-01-06 ENCOUNTER — PATIENT MESSAGE (OUTPATIENT)
Dept: FAMILY MEDICINE CLINIC | Facility: CLINIC | Age: 26
End: 2021-01-06

## 2021-01-18 ENCOUNTER — PATIENT MESSAGE (OUTPATIENT)
Dept: FAMILY MEDICINE CLINIC | Facility: CLINIC | Age: 26
End: 2021-01-18

## 2021-02-26 ENCOUNTER — TELEPHONE (OUTPATIENT)
Dept: FAMILY MEDICINE CLINIC | Facility: CLINIC | Age: 26
End: 2021-02-26

## 2021-03-23 ENCOUNTER — OFFICE VISIT (OUTPATIENT)
Dept: FAMILY MEDICINE CLINIC | Facility: CLINIC | Age: 26
End: 2021-03-23
Payer: MEDICAID

## 2021-03-23 VITALS
OXYGEN SATURATION: 97 % | HEIGHT: 64.49 IN | BODY MASS INDEX: 35.12 KG/M2 | DIASTOLIC BLOOD PRESSURE: 62 MMHG | HEART RATE: 100 BPM | WEIGHT: 208.25 LBS | TEMPERATURE: 97 F | SYSTOLIC BLOOD PRESSURE: 108 MMHG | RESPIRATION RATE: 16 BRPM

## 2021-03-23 DIAGNOSIS — Z01.419 WELL WOMAN EXAM WITH ROUTINE GYNECOLOGICAL EXAM: Primary | ICD-10-CM

## 2021-03-23 DIAGNOSIS — E66.9 OBESITY (BMI 30-39.9): ICD-10-CM

## 2021-03-23 DIAGNOSIS — Z11.3 SCREENING FOR VENEREAL DISEASE: ICD-10-CM

## 2021-03-23 DIAGNOSIS — Z00.00 LABORATORY EXAM ORDERED AS PART OF ROUTINE GENERAL MEDICAL EXAMINATION: ICD-10-CM

## 2021-03-23 DIAGNOSIS — Z30.011 INITIATION OF ORAL CONTRACEPTION: ICD-10-CM

## 2021-03-23 DIAGNOSIS — Z23 NEED FOR VACCINATION: ICD-10-CM

## 2021-03-23 LAB
CONTROL LINE PRESENT WITH A CLEAR BACKGROUND (YES/NO): YES YES/NO
PREGNANCY TEST, URINE: NEGATIVE

## 2021-03-23 PROCEDURE — 87591 N.GONORRHOEAE DNA AMP PROB: CPT | Performed by: FAMILY MEDICINE

## 2021-03-23 PROCEDURE — 88175 CYTOPATH C/V AUTO FLUID REDO: CPT | Performed by: FAMILY MEDICINE

## 2021-03-23 PROCEDURE — 3078F DIAST BP <80 MM HG: CPT | Performed by: FAMILY MEDICINE

## 2021-03-23 PROCEDURE — 87491 CHLMYD TRACH DNA AMP PROBE: CPT | Performed by: FAMILY MEDICINE

## 2021-03-23 PROCEDURE — 99395 PREV VISIT EST AGE 18-39: CPT | Performed by: FAMILY MEDICINE

## 2021-03-23 PROCEDURE — 3008F BODY MASS INDEX DOCD: CPT | Performed by: FAMILY MEDICINE

## 2021-03-23 PROCEDURE — 81025 URINE PREGNANCY TEST: CPT | Performed by: FAMILY MEDICINE

## 2021-03-23 PROCEDURE — 3074F SYST BP LT 130 MM HG: CPT | Performed by: FAMILY MEDICINE

## 2021-03-23 RX ORDER — NORETHINDRONE ACETATE AND ETHINYL ESTRADIOL 1MG-20(21)
1 KIT ORAL DAILY
Qty: 3 PACKAGE | Refills: 0 | Status: SHIPPED | OUTPATIENT
Start: 2021-03-23 | End: 2021-06-29

## 2021-03-23 NOTE — PATIENT INSTRUCTIONS
Prevention Guidelines, Women Ages 25 to 44  Screening tests and vaccines are an important part of managing your health. A screening test is done to find possible disorders or diseases in people who don't have any symptoms.  The goal is to find a disease e Anyone at increased risk  At routine exams   HIV All women At routine exams3     Obesity All women in this age group  At routine exams   Syphilis Women at increased risk for infection should talk with their healthcare provider  At routine exams   Tuberculo (protects against 13 types of pneumococcal bacteria)   PPSV23: 1 to 2 doses through age 59, or 1 dose at 72 or older (protects against 23 types of pneumococcal bacteria)    Tetanus/diphtheria/pertussis (Td/Tdap) booster All women in this age group  Td ever not intended as a substitute for professional medical care. Always follow your healthcare professional's instructions. Breast Health: Breast Self-Awareness  What is breast self-awareness?   Breast self-awareness is knowing how your breasts normally l changes on your breast, such as dimpling or puckering  It’s okay to be upset if you find a lump. Be sure to call your provider right away. Remember that most breast lumps are benign. This means they are not cancer.    Santa last reviewed this educational or mood changes (these often go away within 3 months)  · May increase the risk of blood clots, heart attack, and stroke    The pill may not be for you  The pill may not be for you if:  · You are a smoker and over age 28  · You have high blood pressure or g

## 2021-03-23 NOTE — PROGRESS NOTES
Patient presents with:  Physical: wwe      HPI:  25yr old obese, female presents for her [employer] and requesting OCPs. Menses regular, LMP 3/7 and usually last 7d. Last pap about 3yrs ago. Requesting contraception, has never been on anything.  Denies any pers Never Smoker      Smokeless tobacco: Never Used    Vaping Use      Vaping Use: Never used    Alcohol use: No      Alcohol/week: 0.0 standard drinks    Drug use: No      Current Outpatient Medications   Medication Sig Dispense Refill   • Norethin Ace-Eth Es Last Pap smear: 3yrs ago  4. Dexa Scan: n/a  5.  Immunizations:   Immunization History  Administered            Date(s) Administered    FLULAVAL 6 months & older 0.5 ml Prefilled syringe (48628)                          10/22/2019      FLUZONE 6 months and VIS and will schedule RN visit  - she understands and agrees with tx plan  - RTC after completing labs, sooner if needed    Orders Placed This Encounter      Urine Preg Test      CBC With Diff      CMP      Lipid      TSH W Reflex To Free T4      Vitamin D

## 2021-03-24 LAB
C TRACH DNA SPEC QL NAA+PROBE: NEGATIVE
N GONORRHOEA DNA SPEC QL NAA+PROBE: NEGATIVE

## 2021-04-11 ENCOUNTER — PATIENT MESSAGE (OUTPATIENT)
Dept: FAMILY MEDICINE CLINIC | Facility: CLINIC | Age: 26
End: 2021-04-11

## 2021-04-11 NOTE — TELEPHONE ENCOUNTER
From: Bradley EllisonHolzer Health Systemtano Tabares DO  Sent: 4/11/2021 4:01 PM CDT  Subject: Prescription Question    I had a question about my birth control, it says that one of the side effects is hair loss , does that usually occur?  Or because the dose is low it w

## 2021-06-28 ENCOUNTER — TELEPHONE (OUTPATIENT)
Dept: FAMILY MEDICINE CLINIC | Facility: CLINIC | Age: 26
End: 2021-06-28

## 2021-06-28 DIAGNOSIS — Z30.011 INITIATION OF ORAL CONTRACEPTION: ICD-10-CM

## 2021-06-28 NOTE — TELEPHONE ENCOUNTER
Gynecology Medication Protocol [id number]/28/2021 02:41 PM   PASS-PENDING LAST PAP WNL--VIA MANUAL LOOKUP    Physical or Pelvic/Breast in past 12 or next 3 mos--VIA MANUAL LOOKUP     LOV  3/23/21      LAST LAB  3/23/21 last pap     LAST RX  3/23/21 3 pacage

## 2021-06-28 NOTE — TELEPHONE ENCOUNTER
Would a video visit be OK for this or would this need to be in person? Please advise, pt is asking for virtual but I said I had to double check with doctor.

## 2021-06-28 NOTE — TELEPHONE ENCOUNTER
Seen end of march 2021 for [facility]IT[facility]NN,  discussed IUD with Dr. Anastasia Poole. Now she does not want to move forward with IUD. She would like to switch from pill to patches for birth control.    please advise

## 2021-06-28 NOTE — TELEPHONE ENCOUNTER
Dr. Bertrand Stinson,  Please advise if patient needs OV    LOV 3/23/21  WWE   Initiation of oral contraception    Norethin Ace-Eth Estrad-FE 1-20 MG-MCG Oral Tab 3 Package 0 3/23/2021     Sig - Route: Take 1 tablet by mouth daily.  Start first Sunday of next period

## 2021-06-29 RX ORDER — NORETHINDRONE ACETATE AND ETHINYL ESTRADIOL AND FERROUS FUMARATE 1MG-20(21)
KIT ORAL
Qty: 84 TABLET | Refills: 0 | Status: SHIPPED | OUTPATIENT
Start: 2021-06-29

## 2021-07-04 ENCOUNTER — HOSPITAL ENCOUNTER (EMERGENCY)
Facility: HOSPITAL | Age: 26
Discharge: LEFT BEFORE DISCHARGE | End: 2021-07-04
Attending: EMERGENCY MEDICINE
Payer: MEDICAID

## 2021-07-04 VITALS
SYSTOLIC BLOOD PRESSURE: 118 MMHG | WEIGHT: 200 LBS | RESPIRATION RATE: 20 BRPM | HEART RATE: 105 BPM | BODY MASS INDEX: 33.32 KG/M2 | OXYGEN SATURATION: 98 % | TEMPERATURE: 99 F | DIASTOLIC BLOOD PRESSURE: 83 MMHG | HEIGHT: 65 IN

## 2021-07-04 DIAGNOSIS — R51.9 NONINTRACTABLE HEADACHE, UNSPECIFIED CHRONICITY PATTERN, UNSPECIFIED HEADACHE TYPE: Primary | ICD-10-CM

## 2021-07-04 LAB
ANION GAP SERPL CALC-SCNC: 8 MMOL/L (ref 0–18)
B-HCG UR QL: NEGATIVE
BASOPHILS # BLD AUTO: 0.07 X10(3) UL (ref 0–0.2)
BASOPHILS NFR BLD AUTO: 0.7 %
BUN BLD-MCNC: 13 MG/DL (ref 7–18)
BUN/CREAT SERPL: 18.6 (ref 10–20)
CALCIUM BLD-MCNC: 9.3 MG/DL (ref 8.5–10.1)
CHLORIDE SERPL-SCNC: 105 MMOL/L (ref 98–112)
CO2 SERPL-SCNC: 27 MMOL/L (ref 21–32)
CREAT BLD-MCNC: 0.7 MG/DL
DEPRECATED RDW RBC AUTO: 41.9 FL (ref 35.1–46.3)
EOSINOPHIL # BLD AUTO: 0.38 X10(3) UL (ref 0–0.7)
EOSINOPHIL NFR BLD AUTO: 4 %
ERYTHROCYTE [DISTWIDTH] IN BLOOD BY AUTOMATED COUNT: 15.2 % (ref 11–15)
GLUCOSE BLD-MCNC: 125 MG/DL (ref 70–99)
HCT VFR BLD AUTO: 37.7 %
HGB BLD-MCNC: 11.8 G/DL
IMM GRANULOCYTES # BLD AUTO: 0.02 X10(3) UL (ref 0–1)
IMM GRANULOCYTES NFR BLD: 0.2 %
LYMPHOCYTES # BLD AUTO: 1.91 X10(3) UL (ref 1–4)
LYMPHOCYTES NFR BLD AUTO: 20.3 %
MCH RBC QN AUTO: 23.7 PG (ref 26–34)
MCHC RBC AUTO-ENTMCNC: 31.3 G/DL (ref 31–37)
MCV RBC AUTO: 75.7 FL
MONOCYTES # BLD AUTO: 0.58 X10(3) UL (ref 0.1–1)
MONOCYTES NFR BLD AUTO: 6.2 %
NEUTROPHILS # BLD AUTO: 6.45 X10 (3) UL (ref 1.5–7.7)
NEUTROPHILS # BLD AUTO: 6.45 X10(3) UL (ref 1.5–7.7)
NEUTROPHILS NFR BLD AUTO: 68.6 %
OSMOLALITY SERPL CALC.SUM OF ELEC: 292 MOSM/KG (ref 275–295)
PLATELET # BLD AUTO: 369 10(3)UL (ref 150–450)
POTASSIUM SERPL-SCNC: 3.8 MMOL/L (ref 3.5–5.1)
RBC # BLD AUTO: 4.98 X10(6)UL
SODIUM SERPL-SCNC: 140 MMOL/L (ref 136–145)
WBC # BLD AUTO: 9.4 X10(3) UL (ref 4–11)

## 2021-07-04 PROCEDURE — 85025 COMPLETE CBC W/AUTO DIFF WBC: CPT | Performed by: EMERGENCY MEDICINE

## 2021-07-04 PROCEDURE — 99284 EMERGENCY DEPT VISIT MOD MDM: CPT

## 2021-07-04 PROCEDURE — 81025 URINE PREGNANCY TEST: CPT

## 2021-07-04 PROCEDURE — 96375 TX/PRO/DX INJ NEW DRUG ADDON: CPT

## 2021-07-04 PROCEDURE — 96361 HYDRATE IV INFUSION ADD-ON: CPT

## 2021-07-04 PROCEDURE — 80048 BASIC METABOLIC PNL TOTAL CA: CPT | Performed by: EMERGENCY MEDICINE

## 2021-07-04 PROCEDURE — 96374 THER/PROPH/DIAG INJ IV PUSH: CPT

## 2021-07-04 RX ORDER — METOCLOPRAMIDE HYDROCHLORIDE 5 MG/ML
10 INJECTION INTRAMUSCULAR; INTRAVENOUS ONCE
Status: COMPLETED | OUTPATIENT
Start: 2021-07-04 | End: 2021-07-04

## 2021-07-04 RX ORDER — DIPHENHYDRAMINE HYDROCHLORIDE 50 MG/ML
25 INJECTION INTRAMUSCULAR; INTRAVENOUS ONCE
Status: COMPLETED | OUTPATIENT
Start: 2021-07-04 | End: 2021-07-04

## 2021-07-04 RX ORDER — KETOROLAC TROMETHAMINE 30 MG/ML
30 INJECTION, SOLUTION INTRAMUSCULAR; INTRAVENOUS ONCE
Status: COMPLETED | OUTPATIENT
Start: 2021-07-04 | End: 2021-07-04

## 2021-07-04 NOTE — ED INITIAL ASSESSMENT (HPI)
Pt report migraine since 5pm with nausea and vomiting. Pt states she cannot sleep and pain radiates down her neck. Pt reports this feels worse than her typical migraine. Pt states she took 1 ibuprofen prior to coming to the ER.

## 2021-07-04 NOTE — ED PROVIDER NOTES
Patient Seen in: [facility] Emergency Department      History   Patient presents with:  Headache    Stated Complaint:     HPI/Subjective:   HPI    Patient is a 74-year-old female presents to emergency department for evaluation for headache.   States Head: Normocephalic and atraumatic. Eyes:      General: No scleral icterus. Conjunctiva/sclera: Conjunctivae normal.   Cardiovascular:      Rate and Rhythm: Normal rate. Pulmonary:      Effort: Pulmonary effort is normal. No respiratory distress. the following components:       Result Value    Glucose 125 (*)     All other components within normal limits   CBC W/ DIFFERENTIAL - Abnormal; Notable for the following components:    HGB 11.8 (*)     MCV 75.7 (*)     MCH 23.7 (*)     RDW 15.2 (*)     All

## 2021-08-24 ENCOUNTER — PATIENT MESSAGE (OUTPATIENT)
Dept: FAMILY MEDICINE CLINIC | Facility: CLINIC | Age: 26
End: 2021-08-24

## 2021-08-24 NOTE — TELEPHONE ENCOUNTER
From: Berta Mahoney DO  Sent: 8/24/2021 6:10 AM CDT  Subject: Prescription Question    Hi,   recently my mom had a heart attack she got a stunt put in, in [place] but since her medical card is [facility] and not [place] she is not able to get

## 2022-06-08 ENCOUNTER — PATIENT MESSAGE (OUTPATIENT)
Dept: FAMILY MEDICINE CLINIC | Facility: CLINIC | Age: 27
End: 2022-06-08

## 2022-06-10 ENCOUNTER — PATIENT MESSAGE (OUTPATIENT)
Dept: FAMILY MEDICINE CLINIC | Facility: CLINIC | Age: 27
End: 2022-06-10

## 2022-06-13 ENCOUNTER — OFFICE VISIT (OUTPATIENT)
Dept: FAMILY MEDICINE CLINIC | Facility: CLINIC | Age: 27
End: 2022-06-13
Payer: MEDICAID

## 2022-06-13 VITALS
HEIGHT: 64.76 IN | WEIGHT: 193.5 LBS | OXYGEN SATURATION: 97 % | BODY MASS INDEX: 32.63 KG/M2 | SYSTOLIC BLOOD PRESSURE: 100 MMHG | DIASTOLIC BLOOD PRESSURE: 62 MMHG | RESPIRATION RATE: 16 BRPM | HEART RATE: 81 BPM | TEMPERATURE: 97 F

## 2022-06-13 DIAGNOSIS — Z30.09 ENCOUNTER FOR COUNSELING REGARDING CONTRACEPTION: ICD-10-CM

## 2022-06-13 DIAGNOSIS — N61.1 ABSCESS OF LEFT BREAST: Primary | ICD-10-CM

## 2022-06-13 LAB
CONTROL LINE PRESENT WITH A CLEAR BACKGROUND (YES/NO): YES YES/NO
PREGNANCY TEST, URINE: NEGATIVE

## 2022-06-13 PROCEDURE — 87591 N.GONORRHOEAE DNA AMP PROB: CPT | Performed by: FAMILY MEDICINE

## 2022-06-13 PROCEDURE — 87491 CHLMYD TRACH DNA AMP PROBE: CPT | Performed by: FAMILY MEDICINE

## 2022-06-13 RX ORDER — CEFADROXIL 500 MG/1
500 CAPSULE ORAL 2 TIMES DAILY
Qty: 14 CAPSULE | Refills: 0 | Status: SHIPPED | OUTPATIENT
Start: 2022-06-13 | End: 2022-06-20

## 2022-06-14 LAB
C TRACH DNA SPEC QL NAA+PROBE: NEGATIVE
N GONORRHOEA DNA SPEC QL NAA+PROBE: NEGATIVE

## 2022-06-17 ENCOUNTER — TELEPHONE (OUTPATIENT)
Dept: FAMILY MEDICINE CLINIC | Facility: CLINIC | Age: 27
End: 2022-06-17

## 2022-06-17 NOTE — TELEPHONE ENCOUNTER
Pt called stating she saw Dr Magali Avitia on 6-13-22. Discussed IUD. Pt decided to go with the Paragard IUD. Needs to be ordered.       Pt is scheduled with Dr Magali Avitia on 6-29-22

## 2022-06-17 NOTE — TELEPHONE ENCOUNTER
Future Appointments   Date Time Provider Luz Banda   6/29/2022 10:20 AM Chilango Oris, DO [facility]   8/15/2022 11:40 AM Chilango Oris, DO [facility]     MA notified to order Paragard IUD

## 2022-06-27 ENCOUNTER — HOSPITAL ENCOUNTER (EMERGENCY)
Facility: HOSPITAL | Age: 27
Discharge: HOME | End: 2022-06-27
Attending: EMERGENCY MEDICINE
Payer: MEDICAID

## 2022-06-27 ENCOUNTER — APPOINTMENT (OUTPATIENT)
Dept: CT IMAGING | Facility: HOSPITAL | Age: 27
End: 2022-06-27
Attending: EMERGENCY MEDICINE
Payer: MEDICAID

## 2022-06-27 VITALS
RESPIRATION RATE: 10 BRPM | OXYGEN SATURATION: 100 % | DIASTOLIC BLOOD PRESSURE: 68 MMHG | SYSTOLIC BLOOD PRESSURE: 108 MMHG | BODY MASS INDEX: 32.95 KG/M2 | HEART RATE: 66 BPM | HEIGHT: 64 IN | WEIGHT: 193 LBS | TEMPERATURE: 98 F

## 2022-06-27 DIAGNOSIS — R42 DIZZINESS: Primary | ICD-10-CM

## 2022-06-27 LAB
ALBUMIN SERPL-MCNC: 3.9 G/DL (ref 3.4–5)
ALBUMIN/GLOB SERPL: 1 {RATIO} (ref 1–2)
ALP LIVER SERPL-CCNC: 58 U/L
ALT SERPL-CCNC: 15 U/L
ANION GAP SERPL CALC-SCNC: 6 MMOL/L (ref 0–18)
AST SERPL-CCNC: 10 U/L (ref 15–37)
B-HCG UR QL: NEGATIVE
BASOPHILS # BLD AUTO: 0.04 X10(3) UL (ref 0–0.2)
BASOPHILS NFR BLD AUTO: 0.6 %
BILIRUB SERPL-MCNC: 0.4 MG/DL (ref 0.1–2)
BILIRUB UR QL STRIP.AUTO: NEGATIVE
BUN BLD-MCNC: 8 MG/DL (ref 7–18)
CALCIUM BLD-MCNC: 9.3 MG/DL (ref 8.5–10.1)
CHLORIDE SERPL-SCNC: 106 MMOL/L (ref 98–112)
CO2 SERPL-SCNC: 26 MMOL/L (ref 21–32)
COLOR UR AUTO: YELLOW
CREAT BLD-MCNC: 0.74 MG/DL
EOSINOPHIL # BLD AUTO: 0.12 X10(3) UL (ref 0–0.7)
EOSINOPHIL NFR BLD AUTO: 1.8 %
ERYTHROCYTE [DISTWIDTH] IN BLOOD BY AUTOMATED COUNT: 15 %
GLOBULIN PLAS-MCNC: 4.1 G/DL (ref 2.8–4.4)
GLUCOSE BLD-MCNC: 93 MG/DL (ref 70–99)
GLUCOSE UR STRIP.AUTO-MCNC: NEGATIVE MG/DL
HCT VFR BLD AUTO: 40.2 %
HGB BLD-MCNC: 12.5 G/DL
IMM GRANULOCYTES # BLD AUTO: 0.01 X10(3) UL (ref 0–1)
IMM GRANULOCYTES NFR BLD: 0.1 %
KETONES UR STRIP.AUTO-MCNC: NEGATIVE MG/DL
LEUKOCYTE ESTERASE UR QL STRIP.AUTO: NEGATIVE
LYMPHOCYTES # BLD AUTO: 2.35 X10(3) UL (ref 1–4)
LYMPHOCYTES NFR BLD AUTO: 34.3 %
MCH RBC QN AUTO: 24.5 PG (ref 26–34)
MCHC RBC AUTO-ENTMCNC: 31.1 G/DL (ref 31–37)
MCV RBC AUTO: 78.8 FL
MONOCYTES # BLD AUTO: 0.41 X10(3) UL (ref 0.1–1)
MONOCYTES NFR BLD AUTO: 6 %
NEUTROPHILS # BLD AUTO: 3.92 X10 (3) UL (ref 1.5–7.7)
NEUTROPHILS # BLD AUTO: 3.92 X10(3) UL (ref 1.5–7.7)
NEUTROPHILS NFR BLD AUTO: 57.2 %
NITRITE UR QL STRIP.AUTO: NEGATIVE
OSMOLALITY SERPL CALC.SUM OF ELEC: 284 MOSM/KG (ref 275–295)
PH UR STRIP.AUTO: 6 [PH] (ref 5–8)
PLATELET # BLD AUTO: 338 10(3)UL (ref 150–450)
POTASSIUM SERPL-SCNC: 3.7 MMOL/L (ref 3.5–5.1)
PROT SERPL-MCNC: 8 G/DL (ref 6.4–8.2)
PROT UR STRIP.AUTO-MCNC: NEGATIVE MG/DL
RBC # BLD AUTO: 5.1 X10(6)UL
RBC UR QL AUTO: NEGATIVE
SODIUM SERPL-SCNC: 138 MMOL/L (ref 136–145)
SP GR UR STRIP.AUTO: 1.02 (ref 1–1.03)
UROBILINOGEN UR STRIP.AUTO-MCNC: <2 MG/DL
WBC # BLD AUTO: 6.9 X10(3) UL (ref 4–11)

## 2022-06-27 PROCEDURE — 85025 COMPLETE CBC W/AUTO DIFF WBC: CPT | Performed by: EMERGENCY MEDICINE

## 2022-06-27 PROCEDURE — 81003 URINALYSIS AUTO W/O SCOPE: CPT | Performed by: EMERGENCY MEDICINE

## 2022-06-27 PROCEDURE — 99284 EMERGENCY DEPT VISIT MOD MDM: CPT

## 2022-06-27 PROCEDURE — 96360 HYDRATION IV INFUSION INIT: CPT

## 2022-06-27 PROCEDURE — 70450 CT HEAD/BRAIN W/O DYE: CPT | Performed by: EMERGENCY MEDICINE

## 2022-06-27 PROCEDURE — 96361 HYDRATE IV INFUSION ADD-ON: CPT

## 2022-06-27 PROCEDURE — 81025 URINE PREGNANCY TEST: CPT

## 2022-06-27 PROCEDURE — 80053 COMPREHEN METABOLIC PANEL: CPT | Performed by: EMERGENCY MEDICINE

## 2022-06-27 RX ORDER — MECLIZINE HYDROCHLORIDE 25 MG/1
25 TABLET ORAL ONCE
Status: COMPLETED | OUTPATIENT
Start: 2022-06-27 | End: 2022-06-27

## 2022-06-27 RX ORDER — SODIUM CHLORIDE 9 MG/ML
INJECTION, SOLUTION INTRAVENOUS ONCE
Status: COMPLETED | OUTPATIENT
Start: 2022-06-27 | End: 2022-06-27

## 2022-06-27 RX ORDER — MECLIZINE HYDROCHLORIDE 25 MG/1
25 TABLET ORAL 3 TIMES DAILY PRN
Qty: 15 TABLET | Refills: 0 | Status: SHIPPED | OUTPATIENT
Start: 2022-06-27

## 2022-06-27 NOTE — ED INITIAL ASSESSMENT (HPI)
PT c/o dizziness since yesterday. PT also c/o R sided headache and N/V. PT also c/o bilateral ear pain.  Denies fever

## 2022-06-28 ENCOUNTER — PATIENT MESSAGE (OUTPATIENT)
Dept: FAMILY MEDICINE CLINIC | Facility: CLINIC | Age: 27
End: 2022-06-28

## 2022-06-29 ENCOUNTER — TELEPHONE (OUTPATIENT)
Dept: FAMILY MEDICINE CLINIC | Facility: CLINIC | Age: 27
End: 2022-06-29

## 2022-06-29 ENCOUNTER — PATIENT MESSAGE (OUTPATIENT)
Dept: FAMILY MEDICINE CLINIC | Facility: CLINIC | Age: 27
End: 2022-06-29

## 2022-06-29 NOTE — TELEPHONE ENCOUNTER
Called pt to notify of no show and fee for today's visit, pt said she sent my chart msg to cancel appt due to having her menstrual cycle, informed pt msg sent yesterday mentions other symptoms does not state she wants to cxl appt, pt then sent a my chart msg after call asking to rsch appt

## 2022-06-29 NOTE — TELEPHONE ENCOUNTER
[phone]   Called pt stating still experiencing dizziness. Seen in ER Monday (6/27/22)- work up negative. Pt indicated was feeling slightly better yesterday (6/28/22) after 3rd dose of Meclizine. Today feels dizziness not improving with med and has taken 2nd dose, will take 3rd dose around dinner. No known head injury. No CP. No SOB. No N/V. No HA. No vision changes. No sensation of passing out. No fever. Able to do some activities, otherwise resting as much as possible. Keeping hydrated and eating ok. Advised to walk and change positions slowly. Scheduled OV tomorrow (6/30) @10:00am, confirmed with pt will have  bring pt as pt should not drive. No further questions or concerns. Pt verbalized understanding and agreed with POC. KWAME.

## 2022-06-29 NOTE — TELEPHONE ENCOUNTER
Pt calling stating she was recently in the ED for dizziness/possible vertigo. Pt wants to come in for an appointment. Said yesterday the meclizine was helping, but today it is not and she has taken 2 already. Please advise      Pt also wanting to reschedule IUD appt from today. Could not make it in because of her dizziness. Wants to come in before next period?  Please advise

## 2022-06-30 ENCOUNTER — PATIENT MESSAGE (OUTPATIENT)
Dept: FAMILY MEDICINE CLINIC | Facility: CLINIC | Age: 27
End: 2022-06-30

## 2022-06-30 NOTE — TELEPHONE ENCOUNTER
Pt missed her appt again today. Will need to be seen in the UC, if symptoms persist. Will not be able to accommodate in office.

## 2022-06-30 NOTE — TELEPHONE ENCOUNTER
[phone]    for pt (Tosin Green per HIPAA) to inform f/u on missed OV today (6/30) and mychart message, no further availability today or tomorrow. If sx persist will need to go to UC/ ER for further management. May see another provider Dr. Yolie Briones next Wednesday (7/6) for f/u after if needed. To call back at the office if any further questions.

## 2022-08-25 ENCOUNTER — HOSPITAL ENCOUNTER (EMERGENCY)
Facility: HOSPITAL | Age: 27
Discharge: HOME | End: 2022-08-25
Attending: EMERGENCY MEDICINE
Payer: MEDICAID

## 2022-08-25 VITALS
RESPIRATION RATE: 18 BRPM | OXYGEN SATURATION: 100 % | DIASTOLIC BLOOD PRESSURE: 75 MMHG | HEART RATE: 87 BPM | WEIGHT: 193 LBS | TEMPERATURE: 99 F | BODY MASS INDEX: 33 KG/M2 | SYSTOLIC BLOOD PRESSURE: 116 MMHG

## 2022-08-25 DIAGNOSIS — L73.9 FOLLICULITIS: Primary | ICD-10-CM

## 2022-08-25 PROCEDURE — 99283 EMERGENCY DEPT VISIT LOW MDM: CPT

## 2022-08-25 RX ORDER — MULTIVIT WITH CALCIUM,IRON,MIN
1 TABLET ORAL DAILY
COMMUNITY

## 2022-08-25 RX ORDER — SULFAMETHOXAZOLE AND TRIMETHOPRIM 800; 160 MG/1; MG/1
1 TABLET ORAL 2 TIMES DAILY
Qty: 20 TABLET | Refills: 0 | Status: SHIPPED | OUTPATIENT
Start: 2022-08-25 | End: 2022-09-04

## 2022-08-25 NOTE — ED INITIAL ASSESSMENT (HPI)
Pt states that she has an ingrown to the left breast for the past 6 months, pt reports that she had been putting hydrocortisone on the ingrown to ease the itching but starting yesterday she began feeling a pinching sensation to the affected area. No pain medication taken pta. Pt states that she can no longer tolerate the pain, pt does state that the breast pain does worsen with palpation to the site. Pt also states that the affected area has been increasingly red, inflamed and warm to the touch. No fevers at home. Pt also reports recent flu and cough that started last night that has been hurting her throat.

## 2024-08-06 ENCOUNTER — TELEPHONE (OUTPATIENT)
Dept: FAMILY MEDICINE CLINIC | Facility: CLINIC | Age: 29
End: 2024-08-06

## 2024-08-06 NOTE — TELEPHONE ENCOUNTER
Patient calling requesting an appointment for a work physical. Patient has not been seen since 6/13/2022. Can we fit patient in somewhere for quick visit? Or can we schedule her for physical soon? She needs form completed as soon as possible. Please advise

## 2024-08-07 NOTE — TELEPHONE ENCOUNTER
Spoke with patient to offer tomorrow 8/8/24 at 8:40am - patient declined due to work hours. Asked about weekends or evenings. None available. Patient states she will call back.

## 2024-10-09 ENCOUNTER — LAB ENCOUNTER (OUTPATIENT)
Dept: LAB | Age: 29
End: 2024-10-09
Attending: FAMILY MEDICINE
Payer: MEDICAID

## 2024-10-09 ENCOUNTER — OFFICE VISIT (OUTPATIENT)
Dept: FAMILY MEDICINE CLINIC | Facility: CLINIC | Age: 29
End: 2024-10-09
Payer: MEDICAID

## 2024-10-09 VITALS
RESPIRATION RATE: 16 BRPM | TEMPERATURE: 98 F | BODY MASS INDEX: 33.22 KG/M2 | SYSTOLIC BLOOD PRESSURE: 112 MMHG | HEART RATE: 72 BPM | HEIGHT: 65 IN | DIASTOLIC BLOOD PRESSURE: 70 MMHG | OXYGEN SATURATION: 97 % | WEIGHT: 199.38 LBS

## 2024-10-09 DIAGNOSIS — E66.9 OBESITY (BMI 30-39.9): ICD-10-CM

## 2024-10-09 DIAGNOSIS — Z11.1 SCREENING FOR TUBERCULOSIS: ICD-10-CM

## 2024-10-09 DIAGNOSIS — Z00.00 LABORATORY EXAM ORDERED AS PART OF ROUTINE GENERAL MEDICAL EXAMINATION: ICD-10-CM

## 2024-10-09 DIAGNOSIS — D50.9 IRON DEFICIENCY ANEMIA, UNSPECIFIED IRON DEFICIENCY ANEMIA TYPE: ICD-10-CM

## 2024-10-09 DIAGNOSIS — Z01.419 WELL WOMAN EXAM WITH ROUTINE GYNECOLOGICAL EXAM: Primary | ICD-10-CM

## 2024-10-09 DIAGNOSIS — N60.82 SEBACEOUS CYST OF BREAST, LEFT: ICD-10-CM

## 2024-10-09 DIAGNOSIS — Z01.84 IMMUNITY STATUS TESTING: ICD-10-CM

## 2024-10-09 DIAGNOSIS — D50.9 HYPOCHROMIC MICROCYTIC ANEMIA: ICD-10-CM

## 2024-10-09 DIAGNOSIS — Z23 NEED FOR VACCINATION: ICD-10-CM

## 2024-10-09 PROBLEM — O99.013 ANEMIA AFFECTING PREGNANCY IN THIRD TRIMESTER (HCC): Status: RESOLVED | Noted: 2017-10-09 | Resolved: 2024-10-09

## 2024-10-09 LAB
ALBUMIN SERPL-MCNC: 4.7 G/DL (ref 3.2–4.8)
ALBUMIN/GLOB SERPL: 1.6 {RATIO} (ref 1–2)
ALP LIVER SERPL-CCNC: 61 U/L
ALT SERPL-CCNC: 14 U/L
ANION GAP SERPL CALC-SCNC: 8 MMOL/L (ref 0–18)
AST SERPL-CCNC: 20 U/L (ref ?–34)
BASOPHILS # BLD AUTO: 0.04 X10(3) UL (ref 0–0.2)
BASOPHILS NFR BLD AUTO: 0.6 %
BILIRUB SERPL-MCNC: 0.5 MG/DL (ref 0.3–1.2)
BUN BLD-MCNC: 10 MG/DL (ref 9–23)
CALCIUM BLD-MCNC: 9.4 MG/DL (ref 8.7–10.4)
CHLORIDE SERPL-SCNC: 105 MMOL/L (ref 98–112)
CHOLEST SERPL-MCNC: 174 MG/DL (ref ?–200)
CO2 SERPL-SCNC: 26 MMOL/L (ref 21–32)
CREAT BLD-MCNC: 0.74 MG/DL
DEPRECATED HBV CORE AB SER IA-ACNC: 7 NG/ML
EGFRCR SERPLBLD CKD-EPI 2021: 114 ML/MIN/1.73M2 (ref 60–?)
EOSINOPHIL # BLD AUTO: 0.17 X10(3) UL (ref 0–0.7)
EOSINOPHIL NFR BLD AUTO: 2.5 %
ERYTHROCYTE [DISTWIDTH] IN BLOOD BY AUTOMATED COUNT: 14.4 %
EST. AVERAGE GLUCOSE BLD GHB EST-MCNC: 114 MG/DL (ref 68–126)
FASTING PATIENT LIPID ANSWER: YES
FASTING STATUS PATIENT QL REPORTED: YES
GLOBULIN PLAS-MCNC: 2.9 G/DL (ref 2–3.5)
GLUCOSE BLD-MCNC: 93 MG/DL (ref 70–99)
HBA1C MFR BLD: 5.6 % (ref ?–5.7)
HCT VFR BLD AUTO: 38.4 %
HDLC SERPL-MCNC: 42 MG/DL (ref 40–59)
HGB BLD-MCNC: 12.3 G/DL
IMM GRANULOCYTES # BLD AUTO: 0.01 X10(3) UL (ref 0–1)
IMM GRANULOCYTES NFR BLD: 0.1 %
IRON SATN MFR SERPL: 12 %
IRON SERPL-MCNC: 44 UG/DL
LDLC SERPL CALC-MCNC: 114 MG/DL (ref ?–100)
LYMPHOCYTES # BLD AUTO: 2.05 X10(3) UL (ref 1–4)
LYMPHOCYTES NFR BLD AUTO: 30.7 %
MCH RBC QN AUTO: 25.2 PG (ref 26–34)
MCHC RBC AUTO-ENTMCNC: 32 G/DL (ref 31–37)
MCV RBC AUTO: 78.7 FL
MONOCYTES # BLD AUTO: 0.53 X10(3) UL (ref 0.1–1)
MONOCYTES NFR BLD AUTO: 7.9 %
NEUTROPHILS # BLD AUTO: 3.88 X10 (3) UL (ref 1.5–7.7)
NEUTROPHILS # BLD AUTO: 3.88 X10(3) UL (ref 1.5–7.7)
NEUTROPHILS NFR BLD AUTO: 58.2 %
NONHDLC SERPL-MCNC: 132 MG/DL (ref ?–130)
OSMOLALITY SERPL CALC.SUM OF ELEC: 287 MOSM/KG (ref 275–295)
PLATELET # BLD AUTO: 349 10(3)UL (ref 150–450)
POTASSIUM SERPL-SCNC: 4.2 MMOL/L (ref 3.5–5.1)
PROT SERPL-MCNC: 7.6 G/DL (ref 5.7–8.2)
RBC # BLD AUTO: 4.88 X10(6)UL
RUBV IGG SER QL: POSITIVE
RUBV IGG SER-ACNC: 142 IU/ML (ref 10–?)
SODIUM SERPL-SCNC: 139 MMOL/L (ref 136–145)
TOTAL IRON BINDING CAPACITY: 353 UG/DL (ref 250–425)
TRANSFERRIN SERPL-MCNC: 274 MG/DL (ref 250–380)
TRIGL SERPL-MCNC: 97 MG/DL (ref 30–149)
TSI SER-ACNC: 2.39 MIU/ML (ref 0.55–4.78)
VIT B12 SERPL-MCNC: 452 PG/ML (ref 211–911)
VIT D+METAB SERPL-MCNC: 18.8 NG/ML (ref 30–100)
VLDLC SERPL CALC-MCNC: 17 MG/DL (ref 0–30)
WBC # BLD AUTO: 6.7 X10(3) UL (ref 4–11)

## 2024-10-09 PROCEDURE — 82728 ASSAY OF FERRITIN: CPT

## 2024-10-09 PROCEDURE — 36415 COLL VENOUS BLD VENIPUNCTURE: CPT

## 2024-10-09 PROCEDURE — 86735 MUMPS ANTIBODY: CPT

## 2024-10-09 PROCEDURE — 88175 CYTOPATH C/V AUTO FLUID REDO: CPT | Performed by: FAMILY MEDICINE

## 2024-10-09 PROCEDURE — 84443 ASSAY THYROID STIM HORMONE: CPT

## 2024-10-09 PROCEDURE — 86765 RUBEOLA ANTIBODY: CPT

## 2024-10-09 PROCEDURE — 82607 VITAMIN B-12: CPT

## 2024-10-09 PROCEDURE — 83036 HEMOGLOBIN GLYCOSYLATED A1C: CPT

## 2024-10-09 PROCEDURE — 90656 IIV3 VACC NO PRSV 0.5 ML IM: CPT | Performed by: FAMILY MEDICINE

## 2024-10-09 PROCEDURE — 83550 IRON BINDING TEST: CPT

## 2024-10-09 PROCEDURE — 80061 LIPID PANEL: CPT

## 2024-10-09 PROCEDURE — 86480 TB TEST CELL IMMUN MEASURE: CPT

## 2024-10-09 PROCEDURE — 87624 HPV HI-RISK TYP POOLED RSLT: CPT | Performed by: FAMILY MEDICINE

## 2024-10-09 PROCEDURE — 86762 RUBELLA ANTIBODY: CPT

## 2024-10-09 PROCEDURE — 90471 IMMUNIZATION ADMIN: CPT | Performed by: FAMILY MEDICINE

## 2024-10-09 PROCEDURE — 80053 COMPREHEN METABOLIC PANEL: CPT

## 2024-10-09 PROCEDURE — 82306 VITAMIN D 25 HYDROXY: CPT

## 2024-10-09 PROCEDURE — 85025 COMPLETE CBC W/AUTO DIFF WBC: CPT

## 2024-10-09 PROCEDURE — 99213 OFFICE O/P EST LOW 20 MIN: CPT | Performed by: FAMILY MEDICINE

## 2024-10-09 PROCEDURE — 83540 ASSAY OF IRON: CPT

## 2024-10-09 PROCEDURE — 99395 PREV VISIT EST AGE 18-39: CPT | Performed by: FAMILY MEDICINE

## 2024-10-09 RX ORDER — COPPER 313.4 MG/1
1 INTRAUTERINE DEVICE INTRAUTERINE ONCE
COMMUNITY

## 2024-10-09 RX ORDER — IBUPROFEN 800 MG/1
TABLET, FILM COATED ORAL
COMMUNITY
Start: 2024-09-07

## 2024-10-09 RX ORDER — SULFAMETHOXAZOLE/TRIMETHOPRIM 800-160 MG
TABLET ORAL
COMMUNITY
Start: 2024-09-24

## 2024-10-09 NOTE — PROGRESS NOTES
Chief Complaint   Patient presents with    Physical       HPI:  29yr old obese, female presents for her [employer] and work physical.  Menses regular, LMP 9/2024. Had Paragard IUD placed last month at [facility].   Still notes having small cyst in her L breast. Denies any pain or discomfort.   Iron deficiency anemia, not taking any supplements.  Working full-time at [employer] and needs work form completed. Born in [place] and needs TB testing done.     Review of Systems   Constitutional: Negative for fever, chills and fatigue   HENT: Negative for hearing loss, congestion, sore throat    Eyes: Negative for pain and visual disturbance.   Respiratory: Negative for cough, chest tightness, shortness of breath and wheezing.    Cardiovascular: Negative for chest pain, palpitations and leg swelling.   Gastrointestinal: Negative for nausea, vomiting, abdominal pain, diarrhea   Genitourinary: Negative for dysuria, hematuria and difficulty urinating.   Musculoskeletal: Negative for myalgias, back pain, joint swelling   Skin: Negative for color change and rash.   Neurological: Negative for dizziness, syncope, weakness, and headaches.   Hematological: Negative for adenopathy. Does not bruise/bleed easily.   Psychiatric/Behavioral: The patient is not nervous/anxious. No depression.    Patient Active Problem List   Diagnosis    Iron deficiency anemia due to chronic blood loss    Hypochromic microcytic anemia       Past Medical History:    Anemia    Migraines    Nausea & vomiting     Past Surgical History:   Procedure Laterality Date    Lip surgery proc unlisted       Family History   Problem Relation Age of Onset    Heart Disorder Father     Heart Surgery Father     Gastro-Intestinal Disorder Mother     No Known Problems Paternal Grandmother     Diabetes Maternal Grandmother     Hypertension Maternal Grandmother     Heart Attack Maternal Grandfather     No Known Problems Paternal Grandfather     No Known Problems Brother       Social History     Socioeconomic History    Marital status:    Tobacco Use    Smoking status: Never     Passive exposure: Never    Smokeless tobacco: Never   Vaping Use    Vaping status: Never Used   Substance and Sexual Activity    Alcohol use: Not Currently     Alcohol/week: 0.0 standard drinks of alcohol    Drug use: No    Sexual activity: Yes       Current Outpatient Medications   Medication Sig Dispense Refill    ibuprofen 800 MG Oral Tab       Multiple Vitamins-Minerals (MULTIPLE VITAMINS/WOMENS) Oral Tab Take 1 tablet by mouth daily.      COLLAGEN OR Take by mouth.      sulfamethoxazole-trimethoprim -160 MG Oral Tab per tablet  (Patient not taking: Reported on 10/9/2024)      intrauterine copper contraceptive Intrauterine IUD 1 Intra Uterine Device by Intrauterine route once. (Patient not taking: Reported on 10/9/2024)         Allergies[1]      Physical Exam  /70   Pulse 72   Temp 97.7 °F (36.5 °C) (Temporal)   Resp 16   Ht 5' 5\" (1.651 m)   Wt 199 lb 6 oz (90.4 kg)   SpO2 97%   BMI 33.18 kg/m²   Constitutional: Oriented to person, place, and time. No distress. Obese  HEENT:  Normocephalic and atraumatic. Hearing and tympanic membranes normal. Oropharynx is clear and moist.   Eyes: EOM are normal. PERRLA.    Neck: Supple  Cardiovascular: Normal rate, regular rhythm and intact distal pulses.  No murmur, rubs or gallops.   Pulmonary/Chest: Effort normal and breath sounds normal. No respiratory distress. No wheezes, rhonchi or rales  Abdominal: Soft. Bowel sounds are normal. Non tender, no masses, no organomegaly   : No breast swelling, discharge or bleeding. Small sebaceous cyst noted of L upper breast, non-tender, no erythema. Uterus normal. Pelvic exam was performed with patient supine. There is no rash or tenderness on the right labia. There is no rash or tenderness on the left labia. Cervix exhibits no motion tenderness, no discharge and no friability. IUD strings visualized.  Right adnexum displays no mass, no tenderness and no fullness. Left adnexum displays no mass, no tenderness and no fullness   Musculoskeletal: Normal range of motion. No edema and no tenderness.   Lymphadenopathy: No cervical adenopathy.   Neurological: Normal reflexes. No cranial nerve deficit or sensory deficit. Normal muscle tone. Coordination normal.   Skin: Skin is warm and dry. No rash noted. No erythema   Psychiatric: Normal mood and affect.     Health Maintenance:    1. Colon cancer screening: n/a  2. Last mammogram: n/a  3. Last Pap smear: 2021  4. Dexa Scan: n/a  5. Immunizations:   Immunization History   Administered Date(s) Administered    Covid-19 Vaccine Pfizer 30 mcg/0.3 ml 07/05/2021, 08/29/2021    FLULAVAL 6 months & older 0.5 ml Prefilled syringe (09391) 10/22/2019    FLUZONE 6 months and older PFS 0.5 ml (96522) 09/26/2017    Influenza 10/28/2013    Influenza Vaccine, trivalent (IIV3), PF 0.5mL (22568) 10/09/2024    Meningococcal-Menactra 11/05/2009, 07/28/2015    TDAP 09/26/2017    Varicella 09/05/2008       Osteoporosis Screening: Post menopausal women with a > 9% of fracture in the next 10 years.   Http://www.shef.ac.uk/FRAX/tool.aspx?country=9    Cervical Cancer Screening: The USPSTF recommends screening for cervical cancer in women ages 21 to 65 years with cytology (Pap smear) every 3 years or, for women ages 30 to 65 years who want to lengthen the screening interval, screening with a combination of cytology and human papillomavirus (HPV) testing every 5 years.    Breast Cancer Screening: Yearly starting at the age of 40.  If positive family hx (first degree relative) - Annual mammography starting ten years prior to the age of the youngest family member with breast cancer (but not earlier than age 25 and not later than age 40)    Colon Cancer Screening: Starting at the age of 50.  Yearly FOBT, sigmoidoscopy every 5 years, or colonoscopy every 10 years.       A/P:  1. Well woman exam with  routine gynecological exam  - reviewed anticipatory guidance based on age  - ThinPrep PAP with HPV Reflex Request; Future  - ThinPrep PAP with HPV Reflex Request    2. Laboratory exam ordered as part of routine general medical examination  - check fasting labs  - CBC With Differential With Platelet; Future  - Comp Metabolic Panel; Future  - Lipid Panel; Future  - TSH W Reflex To Free T4; Future  - Vitamin D, 25-Hydroxy; Future  - Hemoglobin A1C; Future    3. Sebaceous cyst of breast, left  - Surgery Referral - Rex ([facility])    4. Iron deficiency anemia, unspecified iron deficiency anemia type  - CBC With Differential With Platelet; Future  - Ferritin [E]; Future  - Iron And Tibc [E]; Future  - Vitamin B12 [E]; Future    5. Screening for tuberculosis  - Quantiferon TB Plus; Future    6. Immunity status testing  - Rubella, IGG [Ed/Elm=Qual/ Quest=Quant]; Future  - Measles (Rubeola) Antibodies, IGG [Ed/Elm=Qual/ Quest=Quant]; Future  - Mumps Antibodies, IGG [Ed/Elm=Qual/ Quest=Quant]; Future    7. Obesity (BMI 30-39.9)  - wt loss with diet/exercise    8. Need for vaccination  - INFLUENZA VACCINE, TRI, PRESERV FREE, 0.5 ML    Will complete work form once TB testing and MMR titers are completed  She understands and agrees with tx plan  RTC after completing labs, sooner if needed      Meds & Refills for this Visit:  Requested Prescriptions      No prescriptions requested or ordered in this encounter       Imaging & Consults:  SURGERY - INTERNAL  INFLUENZA VACCINE, TRI, PRESERV FREE, 0.5 ML      No follow-ups on file.         [1] No Known Allergies

## 2024-10-11 LAB
M TB IFN-G CD4+ T-CELLS BLD-ACNC: 0.04 IU/ML
M TB TUBERC IFN-G BLD QL: POSITIVE
M TB TUBERC IGNF/MITOGEN IGNF CONTROL: >10 IU/ML
MEV IGG SER-ACNC: >300 AU/ML (ref 16.5–?)
MUV IGG SER IA-ACNC: 92.8 AU/ML (ref 11–?)
QFT TB1 AG MINUS NIL: 0.23 IU/ML
QFT TB2 AG MINUS NIL: 0.44 IU/ML

## 2024-10-12 ENCOUNTER — PATIENT MESSAGE (OUTPATIENT)
Dept: FAMILY MEDICINE CLINIC | Facility: CLINIC | Age: 29
End: 2024-10-12

## 2024-10-14 ENCOUNTER — TELEPHONE (OUTPATIENT)
Dept: FAMILY MEDICINE CLINIC | Facility: CLINIC | Age: 29
End: 2024-10-14

## 2024-10-14 DIAGNOSIS — Z11.1 SCREENING EXAMINATION FOR PULMONARY TUBERCULOSIS: ICD-10-CM

## 2024-10-14 DIAGNOSIS — R76.12 POSITIVE QUANTIFERON-TB GOLD TEST: Primary | ICD-10-CM

## 2024-10-14 RX ORDER — ERGOCALCIFEROL 1.25 MG/1
50000 CAPSULE, LIQUID FILLED ORAL WEEKLY
Qty: 12 CAPSULE | Refills: 0 | Status: SHIPPED | OUTPATIENT
Start: 2024-10-14 | End: 2025-01-12

## 2024-10-14 NOTE — TELEPHONE ENCOUNTER
See result notes.  Patient informed of results.   CXR and Vitamin D order placed per written in results.

## 2024-10-14 NOTE — TELEPHONE ENCOUNTER
Pls refer to results tab. Will need CXR for positive TB gold done prior to work form being completed

## 2024-10-15 ENCOUNTER — HOSPITAL ENCOUNTER (OUTPATIENT)
Dept: GENERAL RADIOLOGY | Age: 29
Discharge: HOME | End: 2024-10-15
Attending: FAMILY MEDICINE
Payer: MEDICAID

## 2024-10-15 DIAGNOSIS — R76.12 POSITIVE QUANTIFERON-TB GOLD TEST: ICD-10-CM

## 2024-10-15 DIAGNOSIS — Z11.1 SCREENING EXAMINATION FOR PULMONARY TUBERCULOSIS: ICD-10-CM

## 2024-10-15 LAB
.: NORMAL
.: NORMAL
HPV E6+E7 MRNA CVX QL NAA+PROBE: NEGATIVE

## 2024-10-15 PROCEDURE — 71046 X-RAY EXAM CHEST 2 VIEWS: CPT | Performed by: FAMILY MEDICINE

## 2024-10-16 ENCOUNTER — TELEPHONE (OUTPATIENT)
Dept: FAMILY MEDICINE CLINIC | Facility: CLINIC | Age: 29
End: 2024-10-16

## 2024-10-16 ENCOUNTER — MED REC SCAN ONLY (OUTPATIENT)
Dept: FAMILY MEDICINE CLINIC | Facility: CLINIC | Age: 29
End: 2024-10-16

## 2024-10-16 NOTE — TELEPHONE ENCOUNTER
Patient called, requesting recent labs and chest xray results to be faxed over to [facility]. Faxed to [phone]. Received fax confirmation.

## 2024-10-19 ENCOUNTER — HOSPITAL ENCOUNTER (EMERGENCY)
Facility: HOSPITAL | Age: 29
Discharge: HOME | End: 2024-10-19
Attending: EMERGENCY MEDICINE
Payer: MEDICAID

## 2024-10-19 VITALS
HEART RATE: 114 BPM | RESPIRATION RATE: 20 BRPM | SYSTOLIC BLOOD PRESSURE: 113 MMHG | BODY MASS INDEX: 32 KG/M2 | TEMPERATURE: 99 F | DIASTOLIC BLOOD PRESSURE: 72 MMHG | OXYGEN SATURATION: 97 % | WEIGHT: 190 LBS

## 2024-10-19 DIAGNOSIS — J02.0 STREP PHARYNGITIS: Primary | ICD-10-CM

## 2024-10-19 LAB
FLUAV + FLUBV RNA SPEC NAA+PROBE: NEGATIVE
FLUAV + FLUBV RNA SPEC NAA+PROBE: NEGATIVE
RSV RNA SPEC NAA+PROBE: NEGATIVE
SARS-COV-2 RNA RESP QL NAA+PROBE: NOT DETECTED

## 2024-10-19 PROCEDURE — 0241U SARS-COV-2/FLU A AND B/RSV BY PCR (GENEXPERT): CPT | Performed by: EMERGENCY MEDICINE

## 2024-10-19 PROCEDURE — 99283 EMERGENCY DEPT VISIT LOW MDM: CPT

## 2024-10-19 PROCEDURE — 87430 STREP A AG IA: CPT | Performed by: EMERGENCY MEDICINE

## 2024-10-19 RX ORDER — AMOXICILLIN 875 MG/1
875 TABLET, COATED ORAL 2 TIMES DAILY
Qty: 20 TABLET | Refills: 0 | Status: SHIPPED | OUTPATIENT
Start: 2024-10-19 | End: 2024-10-29

## 2024-10-19 RX ORDER — ACETAMINOPHEN 500 MG
1000 TABLET ORAL ONCE
Status: COMPLETED | OUTPATIENT
Start: 2024-10-19 | End: 2024-10-19

## 2024-10-19 RX ORDER — DEXAMETHASONE 4 MG/1
10 TABLET ORAL ONCE
Status: COMPLETED | OUTPATIENT
Start: 2024-10-19 | End: 2024-10-19

## 2024-10-19 NOTE — ED PROVIDER NOTES
Patient Seen in: [facility] Emergency Department      History     Chief Complaint   Patient presents with    Body ache and/or chills    Sore Throat     Stated Complaint: body aches and sore throat    Subjective:   HPI    29-year-old female presents for evaluation of sore throat, nasal congestion, body aches and chills for the past 24 hours.  Her son is currently sick with strep throat and ear infection.  Patient denies any shortness of breath or cough.  No vomiting or diarrhea.      Objective:     Past Medical History:    Anemia    Migraines    Nausea & vomiting              Past Surgical History:   Procedure Laterality Date    Lip surgery proc unlisted                  Social History     Socioeconomic History    Marital status:    Tobacco Use    Smoking status: Never     Passive exposure: Never    Smokeless tobacco: Never   Vaping Use    Vaping status: Never Used   Substance and Sexual Activity    Alcohol use: Not Currently     Alcohol/week: 0.0 standard drinks of alcohol    Drug use: No    Sexual activity: Yes                  Physical Exam     ED Triage Vitals [10/19/24 1202]   /72   Pulse 114   Resp 20   Temp 98.7 °F (37.1 °C)   Temp src Oral   SpO2 97 %   O2 Device None (Room air)       Current Vitals:   Vital Signs  BP: 113/72  Pulse: 114  Resp: 20  Temp: 98.7 °F (37.1 °C)  Temp src: Oral    Oxygen Therapy  SpO2: 97 %  O2 Device: None (Room air)        Physical Exam     General: Alert, oriented, no apparent distress, no drooling, no stridor  HEENT:  Pupils equal reactive.  Extraocular motions intact. MMM.  Oropharynx erythematous, uvula midline  Neck: Supple  Lungs: Clear to auscultation bilaterally.  Heart: Regular rate and rhythm.  Abdomen: Soft, nontender.  No splenomegaly  Skin: No rash.  No edema.  Neurologic: No focal neurologic deficits.  Normal speech pattern  Musculoskeletal: No tenderness or deformity noted.  Full range of motion throughout.      ED Course     Labs Reviewed    RAPID STREP A SCREEN (LC) - Abnormal; Notable for the following components:       Result Value    Rapid Strep A Result Positive for Beta Streptococcus, Group A (*)     All other components within normal limits   SARS-COV-2/FLU A AND B/RSV BY PCR (GENEXPERT)                   MDM      Differential diagnosis includes strep pharyngitis, viral pharyngitis, COVID, peritonsillar abscess      Medications   dexamethasone (Decadron) tab 10 mg (10 mg Oral Given 10/19/24 1218)   acetaminophen (Tylenol Extra Strength) tab 1,000 mg (1,000 mg Oral Given 10/19/24 1217)     Drug screen is positive.  No signs of abscess on exam.  Amoxicillin prescription called to pharmacy    Return for increasing pain difficulty breathing or swallowing    Medical Decision Making      Disposition and Plan     Clinical Impression:  1. Strep pharyngitis         Disposition:  Discharge  10/19/2024 12:47 pm    Follow-up:  Michelle Hebert DO  [address]  [facility] [id number] [phone]    Call  As needed          Medications Prescribed:  Current Discharge Medication List        START taking these medications    Details   amoxicillin 875 MG Oral Tab Take 1 tablet (875 mg total) by mouth 2 (two) times daily for 10 days.  Qty: 20 tablet, Refills: 0                 Supplementary Documentation:

## 2024-10-19 NOTE — ED INITIAL ASSESSMENT (HPI)
Since last night pt c/o sore throat, chills, pain 10/10 from head to toe, SOB. Pt c/o nausea and vomit x 1.

## 2024-11-15 ENCOUNTER — HOSPITAL ENCOUNTER (EMERGENCY)
Facility: HOSPITAL | Age: 29
Discharge: HOME | End: 2024-11-15
Attending: EMERGENCY MEDICINE
Payer: MEDICAID

## 2024-11-15 VITALS
OXYGEN SATURATION: 100 % | SYSTOLIC BLOOD PRESSURE: 132 MMHG | WEIGHT: 190 LBS | DIASTOLIC BLOOD PRESSURE: 83 MMHG | TEMPERATURE: 98 F | BODY MASS INDEX: 32 KG/M2 | RESPIRATION RATE: 18 BRPM | HEART RATE: 91 BPM

## 2024-11-15 DIAGNOSIS — R42 VERTIGO: Primary | ICD-10-CM

## 2024-11-15 LAB
ALBUMIN SERPL-MCNC: 4.3 G/DL (ref 3.2–4.8)
ALBUMIN/GLOB SERPL: 1.5 {RATIO} (ref 1–2)
ALP LIVER SERPL-CCNC: 51 U/L
ALT SERPL-CCNC: 16 U/L
ANION GAP SERPL CALC-SCNC: 9 MMOL/L (ref 0–18)
AST SERPL-CCNC: 22 U/L (ref ?–34)
BASOPHILS # BLD AUTO: 0.04 X10(3) UL (ref 0–0.2)
BASOPHILS NFR BLD AUTO: 0.5 %
BILIRUB SERPL-MCNC: 0.5 MG/DL (ref 0.3–1.2)
BUN BLD-MCNC: 12 MG/DL (ref 9–23)
CALCIUM BLD-MCNC: 9.8 MG/DL (ref 8.7–10.4)
CHLORIDE SERPL-SCNC: 106 MMOL/L (ref 98–112)
CO2 SERPL-SCNC: 24 MMOL/L (ref 21–32)
CREAT BLD-MCNC: 0.75 MG/DL
EGFRCR SERPLBLD CKD-EPI 2021: 112 ML/MIN/1.73M2 (ref 60–?)
EOSINOPHIL # BLD AUTO: 0.02 X10(3) UL (ref 0–0.7)
EOSINOPHIL NFR BLD AUTO: 0.3 %
ERYTHROCYTE [DISTWIDTH] IN BLOOD BY AUTOMATED COUNT: 14.3 %
GLOBULIN PLAS-MCNC: 2.9 G/DL (ref 2–3.5)
GLUCOSE BLD-MCNC: 143 MG/DL (ref 70–99)
HCG SERPL QL: NEGATIVE
HCT VFR BLD AUTO: 33.8 %
HGB BLD-MCNC: 11.1 G/DL
IMM GRANULOCYTES # BLD AUTO: 0.02 X10(3) UL (ref 0–1)
IMM GRANULOCYTES NFR BLD: 0.3 %
LYMPHOCYTES # BLD AUTO: 1.46 X10(3) UL (ref 1–4)
LYMPHOCYTES NFR BLD AUTO: 18.8 %
MCH RBC QN AUTO: 24.8 PG (ref 26–34)
MCHC RBC AUTO-ENTMCNC: 32.8 G/DL (ref 31–37)
MCV RBC AUTO: 75.6 FL
MONOCYTES # BLD AUTO: 0.36 X10(3) UL (ref 0.1–1)
MONOCYTES NFR BLD AUTO: 4.6 %
NEUTROPHILS # BLD AUTO: 5.85 X10 (3) UL (ref 1.5–7.7)
NEUTROPHILS # BLD AUTO: 5.85 X10(3) UL (ref 1.5–7.7)
NEUTROPHILS NFR BLD AUTO: 75.5 %
OSMOLALITY SERPL CALC.SUM OF ELEC: 290 MOSM/KG (ref 275–295)
PLATELET # BLD AUTO: 342 10(3)UL (ref 150–450)
POTASSIUM SERPL-SCNC: 3.2 MMOL/L (ref 3.5–5.1)
PROT SERPL-MCNC: 7.2 G/DL (ref 5.7–8.2)
RBC # BLD AUTO: 4.47 X10(6)UL
SODIUM SERPL-SCNC: 139 MMOL/L (ref 136–145)
WBC # BLD AUTO: 7.8 X10(3) UL (ref 4–11)

## 2024-11-15 PROCEDURE — 96375 TX/PRO/DX INJ NEW DRUG ADDON: CPT

## 2024-11-15 PROCEDURE — 85025 COMPLETE CBC W/AUTO DIFF WBC: CPT | Performed by: EMERGENCY MEDICINE

## 2024-11-15 PROCEDURE — 85025 COMPLETE CBC W/AUTO DIFF WBC: CPT

## 2024-11-15 PROCEDURE — 84703 CHORIONIC GONADOTROPIN ASSAY: CPT

## 2024-11-15 PROCEDURE — 99284 EMERGENCY DEPT VISIT MOD MDM: CPT

## 2024-11-15 PROCEDURE — 80053 COMPREHEN METABOLIC PANEL: CPT | Performed by: EMERGENCY MEDICINE

## 2024-11-15 PROCEDURE — 96374 THER/PROPH/DIAG INJ IV PUSH: CPT

## 2024-11-15 PROCEDURE — 80053 COMPREHEN METABOLIC PANEL: CPT

## 2024-11-15 PROCEDURE — 84703 CHORIONIC GONADOTROPIN ASSAY: CPT | Performed by: EMERGENCY MEDICINE

## 2024-11-15 RX ORDER — DIPHENHYDRAMINE HYDROCHLORIDE 50 MG/ML
12.5 INJECTION INTRAMUSCULAR; INTRAVENOUS ONCE
Status: COMPLETED | OUTPATIENT
Start: 2024-11-15 | End: 2024-11-15

## 2024-11-15 RX ORDER — MECLIZINE HYDROCHLORIDE 25 MG/1
25 TABLET ORAL ONCE
Status: COMPLETED | OUTPATIENT
Start: 2024-11-15 | End: 2024-11-15

## 2024-11-15 RX ORDER — KETOROLAC TROMETHAMINE 15 MG/ML
15 INJECTION, SOLUTION INTRAMUSCULAR; INTRAVENOUS ONCE
Status: COMPLETED | OUTPATIENT
Start: 2024-11-15 | End: 2024-11-15

## 2024-11-15 RX ORDER — METOCLOPRAMIDE HYDROCHLORIDE 5 MG/ML
10 INJECTION INTRAMUSCULAR; INTRAVENOUS ONCE
Status: COMPLETED | OUTPATIENT
Start: 2024-11-15 | End: 2024-11-15

## 2024-11-15 RX ORDER — MECLIZINE HYDROCHLORIDE 25 MG/1
25 TABLET ORAL 3 TIMES DAILY PRN
Qty: 20 TABLET | Refills: 0 | Status: SHIPPED | OUTPATIENT
Start: 2024-11-15

## 2024-11-16 NOTE — ED PROVIDER NOTES
Patient Seen in: [facility] Emergency Department      History     Chief Complaint   Patient presents with    Dizziness     Stated Complaint: dizziness    Subjective:   HPI      Patient has a history recurrent vertigo.  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  Associate with nausea and vomiting and difficulty walking which is typical for her.  Does have a mild headache that she describes as a migraine that is typically set off for her vertigo.  No numbness or weakness or speech issues.  No visual field cuts or vision changes.    Patient states that she has been having vaginal bleeding every day since having an IUD placed at [facility].    Objective:     Past Medical History:    Anemia    Migraines    Nausea & vomiting              Past Surgical History:   Procedure Laterality Date    Lip surgery proc unlisted                  Social History     Socioeconomic History    Marital status:    Tobacco Use    Smoking status: Never     Passive exposure: Never    Smokeless tobacco: Never   Vaping Use    Vaping status: Never Used   Substance and Sexual Activity    Alcohol use: Not Currently     Alcohol/week: 0.0 standard drinks of alcohol    Drug use: No    Sexual activity: Yes     Social Drivers of Health     Financial Resource Strain: Not on File (11/14/2024)    Received from [facility]    Financial Resource Strain     Financial Resource Strain: 0   Food Insecurity: Not on File (11/14/2024)    Received from [facility]    Food Insecurity     Food: 0   Transportation Needs: Not on File (11/14/2024)    Received from [facility]    Transportation Needs     Transportation: 0   Physical Activity: Not on File (11/14/2024)    Received from [facility]    Physical Activity     Physical Activity: 0   Stress: Not on File (11/14/2024)    Received from [facility]    Stress     Stress: 0   Social Connections: Not on File (11/14/2024)    Received from [facility]     Social Connections     Connectedness: 0   Housing Stability: Not on File (2024)    Received from [facility]    Housing Stability     Housin                  Physical Exam     ED Triage Vitals [11/15/24 1323]   /81   Pulse 90   Resp 14   Temp 97.9 °F (36.6 °C)   Temp src Temporal   SpO2 95 %   O2 Device None (Room air)       Current Vitals:   Vital Signs  BP: 132/83  Pulse: 91  Resp: 18  Temp: 97.9 °F (36.6 °C)  Temp src: Temporal  MAP (mmHg): 98    Oxygen Therapy  SpO2: 100 %  O2 Device: None (Room air)        Physical Exam  Constitutional:  Appears well-developed and well-nourished.   Head: Normocephalic and atraumatic.   Nose: Nose normal.   Eyes: EOM are normal. Pupils are equal, round, and reactive to light.   Neck: Normal range of motion. Neck supple. No JVD present.   Cardiovascular: Normal rate and regular rhythm.    Pulmonary/Chest: Effort normal and breath sounds normal. No stridor.   Abdominal: Soft. There is no tenderness. There is no guarding.   Musculoskeletal: Exhibits no edema or tenderness.   Neurological: Pt is alert and oriented to person, place, and time.     There is no nystagmus.  There are no cranial nerve deficits.  Speech is fluent and not slurred.  There is no word finding difficulty.     Strength is 5 out of 5 in the upper extremities bilaterally.  Sensation is symmetric in the upper extremities and not diminished.    Normal strength and sensation bilateral lower extremities.      no cerebellar   no meningeal signs    Skin: Skin is warm and dry.   Psychiatric: Normal mood and affect. Thought content normal.       ED Course     Labs Reviewed   CBC WITH DIFFERENTIAL WITH PLATELET - Abnormal; Notable for the following components:       Result Value    HGB 11.1 (*)     HCT 33.8 (*)     MCV 75.6 (*)     MCH 24.8 (*)     All other components within normal limits   COMP METABOLIC PANEL (14) - Abnormal; Notable for the following components:    Glucose 143 (*)     Potassium 3.2 (*)      All other components within normal limits   HCG, BETA SUBUNIT, QUAL - Normal   RAINBOW DRAW LAVENDER   RAINBOW DRAW LIGHT GREEN   RAINBOW DRAW BLUE            Blood work reassuring potassium slightly low likely due to vomiting.    Medications   ketorolac (Toradol) 15 MG/ML injection 15 mg (15 mg Intravenous Given 11/15/24 1631)   metoclopramide (Reglan) 5 mg/mL injection 10 mg (10 mg Intravenous Given 11/15/24 1631)   diphenhydrAMINE (Benadryl) 50 mg/mL  injection 12.5 mg (12.5 mg Intravenous Given 11/15/24 1631)   meclizine (Antivert) tab 25 mg (25 mg Oral Given 11/15/24 1630)         Patient felt much better and requested discharge.       MDM          Differential diagnoses considered: Migraine headache, vertigo, BPPV, dehydration, ultralight disturbance all considered.  Acute blood loss anemia due to menorrhagia was consideration as well.    -Vertigo/migraine: Symptoms chronic/recurrent.  Patient states that she felt much better after receiving migraine cocktail and meclizine which requested discharge home.  She has a neurological deficit on exam.  Her history does not suggest anything catastrophic such as vertebral or dissection, meningitis or subarachnoid hemorrhage.  Will discharge her with a prescription for meclizine.    Vaginal bleeding:- hemoglobin acceptable vitals fine.  Outpatient follow-up      I visualized the radiology studies, my independent interpretation: CT scan the brain was considered but deferred, the patient has chronic recurrent symptoms similar in severity and character the past episodes.      Shared decision making was done by: patient, myself.          Medical Decision Making      Disposition and Plan     Clinical Impression:  1. Vertigo         Disposition:  Discharge  11/15/2024  5:26 pm    Follow-up:  Michelle Hebert DO  [address]  [facility] [address]  [phone]    Follow up            Medications Prescribed:  Discharge Medication List as of 11/15/2024  5:33  PM        START taking these medications    Details   meclizine 25 MG Oral Tab Take 1 tablet (25 mg total) by mouth 3 (three) times daily as needed., Normal, Disp-20 tablet, R-0                 Supplementary Documentation:

## (undated) NOTE — LETTER
04/22/21        Logan Krishnam[address] [place] [address]      Dear Isaiah Brown,    [address] [facility] records indicate that you have outstanding lab work and or testing that was ordered for you and has not yet been completed:  Orders Placed This Encounter

## (undated) NOTE — LETTER
Date & Time: 7/4/2021, 2:45 AM  Patient: Rosa Maria Economy  Encounter Provider(s):    Moises Huerta MD         This certifies that Emmett Samaniego, a patient at an [facility], am leaving the facility voluntarily and against the ad

## (undated) NOTE — MR AVS SNAPSHOT
[facility]  [address] [place], [facility] [place] [address]               Thank you for choosing us for your health care visit with Boyd Louie DO.   We are glad to serve you and happy to [place] [id number]   Phone:  [phone]   Fax:  [phone]         Referral Orders      Normal Orders This Visit    [facility] - INTERNAL [[id number] CUSTOM]  Order #:  [id number]         **REFERRAL REQUEST**    Your physician has referred you to a specialist. or hay fever (allergies to pollens and other particles in the air) and cause symptoms of sinus congestion and fullness and perhaps a low-grade fever.  An infection is usually present when there is also facial pain or headache and green or yellow drainage fr You may also use oral tablets containing pseudoephedrine. Side effects from oral decongestants tend to be worse than with nasal sprays or drops, and may keep you from using them. Many sinus remedies combine ingredients, which may increase side effects.  Leatha Sepulveda Follow-up care  Follow up with your healthcare provider, or as advised if your symptoms aren't better in 1 week.   Call 911  Call 911 if any of these occur:  · Unusual drowsiness or confusion  · Swelling of the forehead or eyelids  · Vision problems includi Results of Recent Testing     URINALYSIS, AUTO, W/O SCOPE      Component Value Standard Range & Units    GLUCOSE (URINE DIPSTICK) neg Negative mg/dL    BILIRUBIN neg Negative    KETONES (URINE DIPSTICK) neg Negative mg/dL    SPECIFIC GRAVITY 1.030 1.005 - Avoid over sized portions. Don’t eat while when you’re bored.      EAT THESE FOODS MORE OFTEN: EAT THESE FOODS LESS OFTEN:   Make half your plate fruits and vegetables Highly refined, white starches including white bread, rice and pasta   Eat plenty of pr

## (undated) NOTE — MR AVS SNAPSHOT
[address], [place] [address] [address] [place] [address]  [phone]               Thank you for choosing us for your health care visit with Irving Hale MD.  We are glad to serve you and happy to provide you with this summar Assoc Dx:  Vaginal discharge [N89.8]           AFP, Tetra    Complete by:  Jun 13, 2017 (Approximate)    Assoc Dx:  Encounter for supervision of normal first pregnancy in second trimester [Z34.02]                 Follow-up Instructions     Return in about

## (undated) NOTE — LETTER
Date: 10/22/2019    Patient Name: Rosa Maria Ivan          To Whom it may concern: This letter has been written at the patient's request. The above patient was seen at the [facility] for treatment of a medical condition.     This patient loganu

## (undated) NOTE — LETTER
Date: 9/24/2018    Patient Name: Yvette Gabriel          To Whom it may concern: This letter has been written at the patient's request. The above patient was seen at the [facility] for treatment of a medical condition.     This patient shoul